# Patient Record
Sex: MALE | Race: WHITE | Employment: OTHER | URBAN - NONMETROPOLITAN AREA
[De-identification: names, ages, dates, MRNs, and addresses within clinical notes are randomized per-mention and may not be internally consistent; named-entity substitution may affect disease eponyms.]

---

## 2017-03-28 ENCOUNTER — HOSPITAL ENCOUNTER (OUTPATIENT)
Dept: NON INVASIVE DIAGNOSTICS | Age: 52
Discharge: HOME | End: 2017-03-28
Payer: COMMERCIAL

## 2017-03-28 LAB
LV EF: 50 %
LVEF MODALITY: NORMAL

## 2017-03-28 PROCEDURE — 93350 STRESS TTE ONLY: CPT

## 2017-10-31 ENCOUNTER — TELEPHONE (OUTPATIENT)
Dept: NEUROLOGY | Age: 52
End: 2017-10-31

## 2017-12-11 ENCOUNTER — OFFICE VISIT (OUTPATIENT)
Dept: NEUROLOGY | Facility: CLINIC | Age: 52
End: 2017-12-11

## 2017-12-11 VITALS
DIASTOLIC BLOOD PRESSURE: 72 MMHG | HEIGHT: 70 IN | RESPIRATION RATE: 18 BRPM | SYSTOLIC BLOOD PRESSURE: 130 MMHG | HEART RATE: 78 BPM | BODY MASS INDEX: 28.92 KG/M2 | WEIGHT: 202 LBS

## 2017-12-11 DIAGNOSIS — R20.0 NUMBNESS OF UPPER EXTREMITY: Primary | ICD-10-CM

## 2017-12-11 DIAGNOSIS — R26.89 IMBALANCE: ICD-10-CM

## 2017-12-11 PROCEDURE — 99204 OFFICE O/P NEW MOD 45 MIN: CPT | Performed by: PSYCHIATRY & NEUROLOGY

## 2017-12-11 RX ORDER — ATORVASTATIN CALCIUM 10 MG/1
10 TABLET, FILM COATED ORAL DAILY
COMMUNITY

## 2017-12-11 NOTE — PROGRESS NOTES
Subjective   Matheus Pruett, 1965, is a male who is being seen today for   Chief Complaint   Patient presents with   • Numbness     upper extremity right and left       HISTORY OF PRESENT ILLNESS: Patient  had numbness and chest/ face and left arm originally several months ago.  This lasted about a week and then switch to the right side of the body including the right arm which is lasted for several months.  Patient states he has headaches which are usually occipital to frontal without nausea and vomiting.  Patient is chronically blurred vision and mainly in the left eye where after his cataract surgery patient is had no neck pain.  Patient is had wrist pain and got x-rays of his right wrist which showed no abnormalities.  Patient had EMG and nerve conduction of the upper extremities at another facility which showed tendency for some carpal tunnel the wrist bilaterally and EMG was noncontributory.  There is also noted to have ulnar sensory action potentials amplitudes decreased bilaterally.  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  Patient is had no head or neck injury.  Patient is had some recent blood work that I do not have that for review.  Patient does occasionally has some dizziness    REVIEW OF SYSTEMS:   GENERAL: As above  PULMONARY: No acute chest pain or palpitation  CVS:  No acute chest pain or palpitation  GASTROINTESTINAL: No acute GI distress  GENITOURINARY: No acute  distress  GYN: Not applicable  MUSCULOSKELETAL: As above  HEENT:  As above  ENDOCRINE:  No acute endocrine symptoms  PSYCHIATRIC: No acute psychiatric symptoms  HEMATOLOGY: No recent blood work for review  SKIN: No skin changes  Family history reviewed and otherwise noncontributory  Social history: Patient does smoke.  Patient denies alcohol or drug use      PHYSICAL EXAMINATION:    GENERAL: No acute distress.  Negative Tinel's wrist and elbow negative Phalen's sign but positive Adson's maneuvers  CRANIUM:  Motor sensory about the face normal and symmetric      HEENT: No acute fundic abnormalities.  Pupils equal round reactive to light.  EOMs intact without nystagmus and fields full to confrontation        EYES: As above       EARS:  Tympanic membranes normal hears tuning fork bilaterally       THROAT: No oropharynx abnormalities       NECK:  No bruits/no lymphadenopathy  CHEST: No acute cardiopulmonary abnormalities by auscultation  ABDOMEN: Nondistended  EXTREMITIES: Pulses symmetrical  NEURO: Patient alert and follows commands without difficulty  SPEECH:  Normal    CRANIAL NERVES:  Motor sensory about the face normal and symmetric    MOTOR STRENGTH:  Motor strength upper and lower extremities normal  STATION AND GAIT:  Gait normal/Romberg negative  CEREBELLAR:  Finger-nose and heel shin normal  SENSORY:  Pin and vibration normal upper and lower extremities  REFLEXES:  Reflexes are present and symmetric upper and lower extremity Babinski'    ASSESSMENT AND PLAN:  Patient with history of numbness in the extremities as above.  We are doing MRI brain MRI neck and noninvasive carotids and thoracic outlet studies.  Also doing further blood work.        Matheus was seen today for numbness.    Diagnoses and all orders for this visit:    Numbness of upper extremity  -     CBC & Differential; Future  -     Comprehensive Metabolic Panel; Future  -     Lipid Panel; Future  -     Magnesium; Future  -     Cancel: MRI Brain Without Contrast; Future  -     Cancel: MRI Cervical Spine Without Contrast; Future  -     Sedimentation Rate; Future  -     T4, Free; Future  -     Vitamin B12; Future  -     Folate; Future  -     US Segmental Limbs Upper Arterial With Stress; Future  -     Cancel: EMG; Future  -     MRI Brain Without Contrast; Future  -     MRI Cervical Spine Without Contrast; Future    Imbalance  -     US Carotid Bilateral; Future    Other orders  -     Cancel: EEG; Future

## 2017-12-22 ENCOUNTER — HOSPITAL ENCOUNTER (OUTPATIENT)
Dept: MRI IMAGING | Facility: HOSPITAL | Age: 52
Discharge: HOME | End: 2017-12-22
Attending: PSYCHIATRY & NEUROLOGY

## 2017-12-22 ENCOUNTER — HOSPITAL ENCOUNTER (OUTPATIENT)
Dept: ULTRASOUND IMAGING | Facility: HOSPITAL | Age: 52
Discharge: HOME | End: 2017-12-22
Attending: PSYCHIATRY & NEUROLOGY | Admitting: PSYCHIATRY & NEUROLOGY

## 2017-12-22 ENCOUNTER — HOSPITAL ENCOUNTER (OUTPATIENT)
Dept: ULTRASOUND IMAGING | Facility: HOSPITAL | Age: 52
Discharge: HOME | End: 2017-12-22
Attending: PSYCHIATRY & NEUROLOGY

## 2017-12-22 DIAGNOSIS — R20.0 NUMBNESS OF UPPER EXTREMITY: ICD-10-CM

## 2017-12-22 DIAGNOSIS — R26.89 IMBALANCE: ICD-10-CM

## 2017-12-22 PROCEDURE — 93923 UPR/LXTR ART STDY 3+ LVLS: CPT

## 2017-12-22 PROCEDURE — 93923 UPR/LXTR ART STDY 3+ LVLS: CPT | Performed by: SURGERY

## 2017-12-22 PROCEDURE — 72141 MRI NECK SPINE W/O DYE: CPT

## 2017-12-22 PROCEDURE — 70551 MRI BRAIN STEM W/O DYE: CPT

## 2017-12-22 PROCEDURE — 93880 EXTRACRANIAL BILAT STUDY: CPT | Performed by: SURGERY

## 2017-12-22 PROCEDURE — 93880 EXTRACRANIAL BILAT STUDY: CPT

## 2018-03-02 ENCOUNTER — OFFICE VISIT (OUTPATIENT)
Dept: NEUROLOGY | Facility: CLINIC | Age: 53
End: 2018-03-02

## 2018-03-02 ENCOUNTER — TELEPHONE (OUTPATIENT)
Dept: NEUROLOGY | Facility: CLINIC | Age: 53
End: 2018-03-02

## 2018-03-02 VITALS
HEART RATE: 78 BPM | SYSTOLIC BLOOD PRESSURE: 128 MMHG | WEIGHT: 196 LBS | DIASTOLIC BLOOD PRESSURE: 72 MMHG | HEIGHT: 70 IN | BODY MASS INDEX: 28.06 KG/M2 | RESPIRATION RATE: 18 BRPM

## 2018-03-02 DIAGNOSIS — G89.29 CHRONIC INTRACTABLE HEADACHE, UNSPECIFIED HEADACHE TYPE: Primary | ICD-10-CM

## 2018-03-02 DIAGNOSIS — G56.01 RIGHT CARPAL TUNNEL SYNDROME: ICD-10-CM

## 2018-03-02 DIAGNOSIS — M48.02 NEURAL FORAMINAL STENOSIS OF CERVICAL SPINE: ICD-10-CM

## 2018-03-02 DIAGNOSIS — R40.0 DAYTIME SOMNOLENCE: ICD-10-CM

## 2018-03-02 DIAGNOSIS — R06.83 SNORING: ICD-10-CM

## 2018-03-02 DIAGNOSIS — R51.9 CHRONIC INTRACTABLE HEADACHE, UNSPECIFIED HEADACHE TYPE: Primary | ICD-10-CM

## 2018-03-02 PROCEDURE — 99214 OFFICE O/P EST MOD 30 MIN: CPT | Performed by: CLINICAL NURSE SPECIALIST

## 2018-03-02 PROCEDURE — 99406 BEHAV CHNG SMOKING 3-10 MIN: CPT | Performed by: CLINICAL NURSE SPECIALIST

## 2018-03-02 RX ORDER — AMITRIPTYLINE HYDROCHLORIDE 10 MG/1
10 TABLET, FILM COATED ORAL NIGHTLY
Qty: 30 TABLET | Refills: 2 | Status: SHIPPED | OUTPATIENT
Start: 2018-03-02

## 2018-03-02 NOTE — TELEPHONE ENCOUNTER
----- Message from GRETA Acosta sent at 3/2/2018  2:42 PM CST -----  Please contact patient that B12 is on low end of normal and to take OTC B12.   Thanks  kimmie

## 2018-03-02 NOTE — PROGRESS NOTES
Subjective     Chief Complaint   Patient presents with   • Follow-up     4 week follow-up.       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 He was last seen 12/11/17. He is accompanied by his wife. He did have MRI brain, CUS, UE arterial studies, MRI cervical spine and labs. MRI cervical spine did show spurs and neuro foraminal narrowing severe. Will refer to NS. MRI vishnu shows age related changes. , UE arterial studies negative. Labs did show mildly low B12 300. I have reviewed results with the patient. Patient was told had right carpal tunnel and did have EMG/NCV at outside facility and is in the media tab.  He has not been wearing cock up splint.   Patient has hx of dyslipidemia, HTN, he does snore and complains of sleepiness.   EPWORTH =3, STOP-BANG = HIGH.    Had episode of numbness from head to toe on right side about 1 year ago. Can have sensations of feeling numb all over and can have jerks. Last time this happened about August 2017. happened several times. Not sure if had HA before or after, did not check BP with episodes.     Headache    This is a chronic problem. The current episode started more than 1 year ago (2017). The problem occurs daily (wake up with and go to bed with it). The problem has been unchanged. Pain location: holocephalic. The pain does not radiate. The pain quality is similar to prior headaches. The quality of the pain is described as dull and aching. The pain is at a severity of 2/10. The pain is mild. Pertinent negatives include no dizziness, nausea, phonophobia, sinus pressure, vomiting or weakness. Numbness: ARMS. Nothing aggravates the symptoms. Treatments tried: ibuprofen, takes 4 tablets daily at minimum. The treatment provided no relief. (Dyslipidemia, tobacco)        Current Outpatient Prescriptions   Medication Sig Dispense Refill   • atorvastatin (LIPITOR) 10 MG tablet Take 10 mg by  "mouth Daily.     • losartan (COZAAR) 50 MG tablet Take 50 mg by mouth Daily.     • VITAMIN D, CHOLECALCIFEROL, PO Take 3,000 Units by mouth Daily.     • amitriptyline (ELAVIL) 10 MG tablet Take 1 tablet by mouth Every Night. 30 tablet 2     No current facility-administered medications for this visit.        Past Medical History:   Diagnosis Date   • Hypertension        Past Surgical History:   Procedure Laterality Date   • EYE SURGERY         family history includes Cancer in his father and mother.    Social History   Substance Use Topics   • Smoking status: Current Every Day Smoker     Packs/day: 0.50     Types: Cigarettes   • Smokeless tobacco: Never Used   • Alcohol use No       Review of Systems   Constitutional: Positive for fatigue.   HENT: Negative for postnasal drip and sinus pressure.    Eyes: Negative for visual disturbance.   Respiratory: Negative for apnea, choking and shortness of breath.    Cardiovascular: Negative for chest pain.   Gastrointestinal: Negative for blood in stool, constipation, nausea and vomiting.   Genitourinary: Negative for dysuria and frequency.   Musculoskeletal: Negative for arthralgias, gait problem and myalgias.   Neurological: Positive for headaches. Negative for dizziness and weakness. Numbness: ARMS.   Hematological: Negative for adenopathy.   Psychiatric/Behavioral: Negative for agitation, confusion and hallucinations.       Objective     /72 (BP Location: Right arm, Patient Position: Sitting, Cuff Size: Adult)  Pulse 78  Resp 18  Ht 177.8 cm (70\")  Wt 88.9 kg (196 lb)  BMI 28.12 kg/m2, Body mass index is 28.12 kg/(m^2).    Physical Exam   Constitutional: He is oriented to person, place, and time. He appears well-developed and well-nourished. He is cooperative.   HENT:   Head: Normocephalic and atraumatic.   Right Ear: External ear normal.   Left Ear: External ear normal.   Nose: Nose normal.   Mouth/Throat: Oropharynx is clear and moist.   Eyes: Conjunctivae, EOM " and lids are normal. Pupils are equal, round, and reactive to light. Right eye exhibits normal extraocular motion and no nystagmus. Left eye exhibits normal extraocular motion and no nystagmus. Right pupil is round. Left pupil is round and reactive. Pupils are equal.   Neck: Normal range of motion. Neck supple. Carotid bruit is not present.   Cardiovascular: Normal rate, regular rhythm and normal heart sounds.    No murmur heard.  Pulmonary/Chest: Effort normal and breath sounds normal.   Abdominal: Soft. Bowel sounds are normal.   Musculoskeletal: Normal range of motion.   Neurological: He is alert and oriented to person, place, and time. He has normal strength and normal reflexes. He displays no tremor. No cranial nerve deficit or sensory deficit. He exhibits normal muscle tone. He displays a negative Romberg sign. Coordination and gait normal. GCS eye subscore is 4. GCS verbal subscore is 5. GCS motor subscore is 6.   Reflex Scores:       Tricep reflexes are 2+ on the right side and 2+ on the left side.       Bicep reflexes are 2+ on the right side and 2+ on the left side.       Brachioradialis reflexes are 2+ on the right side and 2+ on the left side.       Patellar reflexes are 2+ on the right side and 2+ on the left side.       Achilles reflexes are 2+ on the right side and 2+ on the left side.  CN II:  Visual fields full.  Pupils equally reactive to light  CN III, IV, VI:  Extraocular Muscles full with no signs of nystagmus  CN V:  Facial sensory is symmetric with no asymetries.  CN VII:  Facial motor symmetric  CN VIII:  Gross hearing intact bilaterally  CN IX:  Palate elevates symmetrically  CN X:  Palate elevates symmetrically  CN XI:  Shoulder shrug symmetric  CN XII:  Tongue is midline on protrusion   Skin: Skin is warm and dry.   Psychiatric: He has a normal mood and affect. His speech is normal and behavior is normal. Cognition and memory are normal.   Nursing note and vitals reviewed.    MRI BRAIN:  IMPRESSION:  1.  No acute intracranial abnormalities.  2.  Nonspecific foci of gliosis in the supratentorial white matter,  favored to reflect mild chronic microvascular changes.    MRI CERVICAL SPINE: IMPRESSION:  Cervical spine.  1.  Congenital and acquired spondylosis.  2.  Multilevel degenerative changes, worse at C6-C7.      UE ARTERIAL STUDIES: IMPRESSION:  Impression:  1.  No significant arterial insufficiency of the right upper extremity  at rest.   2.  No significant arterial insufficiency of the left upper extremity at  rest.   3. There is no dampening of the waveforms with maneuvers.     CAROTID DUPLEX: IMPRESSION:  Impression:  1. There is less than 50% stenosis of the right internal carotid artery.  2. There is less than 50% stenosis of the left internal carotid artery.  3. Antegrade flow is demonstrated in bilateral vertebral arteries.    No results found for this or any previous visit.     ASSESSMENT/PLAN    Diagnoses and all orders for this visit:    Chronic intractable headache, unspecified headache type    Right carpal tunnel syndrome  -     Ambulatory Referral to Neurosurgery    Neural foraminal stenosis of cervical spine  -     Ambulatory Referral to Neurosurgery    Daytime somnolence  -     Home Sleep Study; Future    Snoring  -     Home Sleep Study; Future    Other orders  -     amitriptyline (ELAVIL) 10 MG tablet; Take 1 tablet by mouth Every Night.      MEDICAL DECISION MAKIN. Will trial Elavil for HA and counseled on side effects.  2. Patient to monitor and keep a log of BP and if elevated above 140/80 to contact PCP  3. Will refer to NS for cervical neuro foraminal stenosis  4. Will obtain home sleep study  5. Patient to start OTC B12 replacement.  6.Patient's BMI is above normal parameters. Follow-up plan includes:  educational material and no follow-up required.  7. I advised the patient of the risks in continuing to use tobacco, and I provided this patient with smoking cessation  educational materials.    During this visit, I spent > 3-10 minutes counseling the patient regarding smoking cessation.    At least 25 minutes spent in coordination of care and answering questions.      allergies and all known medications/prescriptions have been reviewed using resources available on this encounter.    Return in about 6 weeks (around 4/13/2018).        GRETA Ramirez

## 2018-03-02 NOTE — PATIENT INSTRUCTIONS
Calorie Counting for Weight Loss  Calories are units of energy. Your body needs a certain amount of calories from food to keep you going throughout the day. When you eat more calories than your body needs, your body stores the extra calories as fat. When you eat fewer calories than your body needs, your body burns fat to get the energy it needs.  Calorie counting means keeping track of how many calories you eat and drink each day. Calorie counting can be helpful if you need to lose weight. If you make sure to eat fewer calories than your body needs, you should lose weight. Ask your health care provider what a healthy weight is for you.  For calorie counting to work, you will need to eat the right number of calories in a day in order to lose a healthy amount of weight per week. A dietitian can help you determine how many calories you need in a day and will give you suggestions on how to reach your calorie goal.  · A healthy amount of weight to lose per week is usually 1-2 lb (0.5-0.9 kg). This usually means that your daily calorie intake should be reduced by 500-750 calories.  · Eating 1,200 - 1,500 calories per day can help most women lose weight.  · Eating 1,500 - 1,800 calories per day can help most men lose weight.  What is my plan?  My goal is to have __________ calories per day.  If I have this many calories per day, I should lose around __________ pounds per week.  What do I need to know about calorie counting?  In order to meet your daily calorie goal, you will need to:  · Find out how many calories are in each food you would like to eat. Try to do this before you eat.  · Decide how much of the food you plan to eat.  · Write down what you ate and how many calories it had. Doing this is called keeping a food log.  To successfully lose weight, it is important to balance calorie counting with a healthy lifestyle that includes regular activity. Aim for 150 minutes of moderate exercise (such as walking) or 75  minutes of vigorous exercise (such as running) each week.  Where do I find calorie information?     The number of calories in a food can be found on a Nutrition Facts label. If a food does not have a Nutrition Facts label, try to look up the calories online or ask your dietitian for help.  Remember that calories are listed per serving. If you choose to have more than one serving of a food, you will have to multiply the calories per serving by the amount of servings you plan to eat. For example, the label on a package of bread might say that a serving size is 1 slice and that there are 90 calories in a serving. If you eat 1 slice, you will have eaten 90 calories. If you eat 2 slices, you will have eaten 180 calories.  How do I keep a food log?  Immediately after each meal, record the following information in your food log:  · What you ate. Don't forget to include toppings, sauces, and other extras on the food.  · How much you ate. This can be measured in cups, ounces, or number of items.  · How many calories each food and drink had.  · The total number of calories in the meal.  Keep your food log near you, such as in a small notebook in your pocket, or use a mobile miriam or website. Some programs will calculate calories for you and show you how many calories you have left for the day to meet your goal.  What are some calorie counting tips?  · Use your calories on foods and drinks that will fill you up and not leave you hungry:  ¨ Some examples of foods that fill you up are nuts and nut butters, vegetables, lean proteins, and high-fiber foods like whole grains. High-fiber foods are foods with more than 5 g fiber per serving.  ¨ Drinks such as sodas, specialty coffee drinks, alcohol, and juices have a lot of calories, yet do not fill you up.  · Eat nutritious foods and avoid empty calories. Empty calories are calories you get from foods or beverages that do not have many vitamins or protein, such as candy, sweets, and  "soda. It is better to have a nutritious high-calorie food (such as an avocado) than a food with few nutrients (such as a bag of chips).  · Know how many calories are in the foods you eat most often. This will help you calculate calorie counts faster.  · Pay attention to calories in drinks. Low-calorie drinks include water and unsweetened drinks.  · Pay attention to nutrition labels for \"low fat\" or \"fat free\" foods. These foods sometimes have the same amount of calories or more calories than the full fat versions. They also often have added sugar, starch, or salt, to make up for flavor that was removed with the fat.  · Find a way of tracking calories that works for you. Get creative. Try different apps or programs if writing down calories does not work for you.  What are some portion control tips?  · Know how many calories are in a serving. This will help you know how many servings of a certain food you can have.  · Use a measuring cup to measure serving sizes. You could also try weighing out portions on a kitchen scale. With time, you will be able to estimate serving sizes for some foods.  · Take some time to put servings of different foods on your favorite plates, bowls, and cups so you know what a serving looks like.  · Try not to eat straight from a bag or box. Doing this can lead to overeating. Put the amount you would like to eat in a cup or on a plate to make sure you are eating the right portion.  · Use smaller plates, glasses, and bowls to prevent overeating.  · Try not to multitask (for example, watch TV or use your computer) while eating. If it is time to eat, sit down at a table and enjoy your food. This will help you to know when you are full. It will also help you to be aware of what you are eating and how much you are eating.  What are tips for following this plan?  Reading food labels   · Check the calorie count compared to the serving size. The serving size may be smaller than what you are used to " eating.  · Check the source of the calories. Make sure the food you are eating is high in vitamins and protein and low in saturated and trans fats.  Shopping   · Read nutrition labels while you shop. This will help you make healthy decisions before you decide to purchase your food.  · Make a grocery list and stick to it.  Cooking   · Try to cook your favorite foods in a healthier way. For example, try baking instead of frying.  · Use low-fat dairy products.  Meal planning   · Use more fruits and vegetables. Half of your plate should be fruits and vegetables.  · Include lean proteins like poultry and fish.  How do I count calories when eating out?  · Ask for smaller portion sizes.  · Consider sharing an entree and sides instead of getting your own entree.  · If you get your own entree, eat only half. Ask for a box at the beginning of your meal and put the rest of your entree in it so you are not tempted to eat it.  · If calories are listed on the menu, choose the lower calorie options.  · Choose dishes that include vegetables, fruits, whole grains, low-fat dairy products, and lean protein.  · Choose items that are boiled, broiled, grilled, or steamed. Stay away from items that are buttered, battered, fried, or served with cream sauce. Items labeled “crispy” are usually fried, unless stated otherwise.  · Choose water, low-fat milk, unsweetened iced tea, or other drinks without added sugar. If you want an alcoholic beverage, choose a lower calorie option such as a glass of wine or light beer.  · Ask for dressings, sauces, and syrups on the side. These are usually high in calories, so you should limit the amount you eat.  · If you want a salad, choose a garden salad and ask for grilled meats. Avoid extra toppings like card, cheese, or fried items. Ask for the dressing on the side, or ask for olive oil and vinegar or lemon to use as dressing.  · Estimate how many servings of a food you are given. For example, a serving  of cooked rice is ½ cup or about the size of half a baseball. Knowing serving sizes will help you be aware of how much food you are eating at restaurants. The list below tells you how big or small some common portion sizes are based on everyday objects:  ¨ 1 oz--4 stacked dice.  ¨ 3 oz--1 deck of cards.  ¨ 1 tsp--1 die.  ¨ 1 Tbsp--½ a ping-pong ball.  ¨ 2 Tbsp--1 ping-pong ball.  ¨ ½ cup--½ baseball.  ¨ 1 cup--1 baseball.  Summary  · Calorie counting means keeping track of how many calories you eat and drink each day. If you eat fewer calories than your body needs, you should lose weight.  · A healthy amount of weight to lose per week is usually 1-2 lb (0.5-0.9 kg). This usually means reducing your daily calorie intake by 500-750 calories.  · The number of calories in a food can be found on a Nutrition Facts label. If a food does not have a Nutrition Facts label, try to look up the calories online or ask your dietitian for help.  · Use your calories on foods and drinks that will fill you up, and not on foods and drinks that will leave you hungry.  · Use smaller plates, glasses, and bowls to prevent overeating.  This information is not intended to replace advice given to you by your health care provider. Make sure you discuss any questions you have with your health care provider.  Document Released: 12/18/2006 Document Revised: 11/17/2017 Document Reviewed: 11/17/2017  [employer] Interactive Patient Education © 2017 [employer]  Steps to Quit Smoking  Smoking tobacco can be bad for your health. It can also affect almost every organ in your body. Smoking puts you and people around you at risk for many serious long-lasting (chronic) diseases. Quitting smoking is hard, but it is one of the best things that you can do for your health. It is never too late to quit.  What are the benefits of quitting smoking?  When you quit smoking, you lower your risk for getting serious diseases and conditions. They can include:  · Lung  cancer or lung disease.  · Heart disease.  · Stroke.  · Heart attack.  · Not being able to have children (infertility).  · Weak bones (osteoporosis) and broken bones (fractures).  If you have coughing, wheezing, and shortness of breath, those symptoms may get better when you quit. You may also get sick less often. If you are pregnant, quitting smoking can help to lower your chances of having a baby of low birth weight.  What can I do to help me quit smoking?  Talk with your doctor about what can help you quit smoking. Some things you can do (strategies) include:  · Quitting smoking totally, instead of slowly cutting back how much you smoke over a period of time.  · Going to in-person counseling. You are more likely to quit if you go to many counseling sessions.  · Using resources and support systems, such as:  ¨ Online chats with a counselor.  ¨ Phone quitlines.  ¨ Printed self-help materials.  ¨ Support groups or group counseling.  ¨ Text messaging programs.  ¨ Mobile phone apps or applications.  · Taking medicines. Some of these medicines may have nicotine in them. If you are pregnant or breastfeeding, do not take any medicines to quit smoking unless your doctor says it is okay. Talk with your doctor about counseling or other things that can help you.  Talk with your doctor about using more than one strategy at the same time, such as taking medicines while you are also going to in-person counseling. This can help make quitting easier.  What things can I do to make it easier to quit?  Quitting smoking might feel very hard at first, but there is a lot that you can do to make it easier. Take these steps:  · Talk to your family and friends. Ask them to support and encourage you.  · Call phone quitlines, reach out to support groups, or work with a counselor.  · Ask people who smoke to not smoke around you.  · Avoid places that make you want (trigger) to smoke, such as:  ¨ Bars.  ¨ Parties.  ¨ Smoke-break areas at  work.  · Spend time with people who do not smoke.  · Lower the stress in your life. Stress can make you want to smoke. Try these things to help your stress:  ¨ Getting regular exercise.  ¨ Deep-breathing exercises.  ¨ Yoga.  ¨ Meditating.  ¨ Doing a body scan. To do this, close your eyes, focus on one area of your body at a time from head to toe, and notice which parts of your body are tense. Try to relax the muscles in those areas.  · Download or buy apps on your mobile phone or tablet that can help you stick to your quit plan. There are many free apps, such as QuitGuide from the CDC (Centers for Disease Control and Prevention). You can find more support from smokefree.gov and other websites.  This information is not intended to replace advice given to you by your health care provider. Make sure you discuss any questions you have with your health care provider.  Document Released: 10/14/2010 Document Revised: 08/15/2017 Document Reviewed: 05/03/2016  [employer] Interactive Patient Education © 2017 [employer]

## 2018-03-28 ENCOUNTER — OFFICE VISIT (OUTPATIENT)
Dept: NEUROSURGERY | Facility: CLINIC | Age: 53
End: 2018-03-28

## 2018-03-28 VITALS — BODY MASS INDEX: 28.06 KG/M2 | HEIGHT: 70 IN | WEIGHT: 196 LBS

## 2018-03-28 DIAGNOSIS — R51.9 CHRONIC INTRACTABLE HEADACHE, UNSPECIFIED HEADACHE TYPE: ICD-10-CM

## 2018-03-28 DIAGNOSIS — F17.200 SMOKER: ICD-10-CM

## 2018-03-28 DIAGNOSIS — M48.02 NEURAL FORAMINAL STENOSIS OF CERVICAL SPINE: Primary | ICD-10-CM

## 2018-03-28 DIAGNOSIS — G56.01 RIGHT CARPAL TUNNEL SYNDROME: ICD-10-CM

## 2018-03-28 DIAGNOSIS — G89.29 CHRONIC INTRACTABLE HEADACHE, UNSPECIFIED HEADACHE TYPE: ICD-10-CM

## 2018-03-28 PROCEDURE — 99214 OFFICE O/P EST MOD 30 MIN: CPT | Performed by: NURSE PRACTITIONER

## 2018-03-28 NOTE — PROGRESS NOTES
Neurosurgery Initial Patient Visit    Patient: Matheus Pruett  : 1965    Primary Care Provider: Mikey Mott PA-C    Requesting Provider: GRETA Quintero      History    Chief Complaint:   Chief Complaint   Patient presents with   • Neck Pain     Pt states that he is having neck, hip and R hand pain.  Pt states that this has been going on for a year. Pt denies any injury.  Pt denies chicropractor, PM or PT.  Pt states that he had a NCV done, however, there is no record of this.   • Arm Pain       History of Present Illness: He is a 52-year-old  male who presents with chief complaint of right hand and neck pain.  He is referred by GRETA Quintero, and we have been asked to see him in consultation for further evaluation.    He gives history of a more insidious onset of problems about a year ago.  He does not recall any contributing injury or event.  Initially, he had been having more symptoms that now appear to have resolved.  Presently, he complains of headaches with neck pain.  He then has pain from his right elbow down to his hand and fingers.  It is mostly the second and third fingers that are affected.  He complains of numbness and tingling and is also starting to notice some weakness.  He has not noted any true radicular pain or symptoms into the upper arm.  He has had no symptoms at all occurring on the left.  He is presently undergoing a neurologic evaluation that has included a cervical MRI.  We have been asked to see him with regard to those results.    Allergies:   Lortab [hydrocodone-acetaminophen]    Past Medical History:    Past Medical History:   Diagnosis Date   • Hypercholesteremia    • Hypertension    • Right carpal tunnel syndrome        Past Surgical History:   Past Surgical History:   Procedure Laterality Date   • EYE SURGERY         Medications:    Current Outpatient Prescriptions on File Prior to Visit   Medication Sig Dispense Refill   • amitriptyline (ELAVIL) 10 MG  tablet Take 1 tablet by mouth Every Night. 30 tablet 2   • atorvastatin (LIPITOR) 10 MG tablet Take 10 mg by mouth Daily.     • losartan (COZAAR) 50 MG tablet Take 50 mg by mouth Daily.     • VITAMIN D, CHOLECALCIFEROL, PO Take 3,000 Units by mouth Daily.       No current facility-administered medications on file prior to visit.         Social History:   reports that he has been smoking Cigarettes.  He has been smoking about 2.00 packs per day. He has never used smokeless tobacco. He reports that he does not drink alcohol or use drugs.  He is  and his wife accompanies him.  They live in [place].  He is the owner of a poultry farm.  With regards to smoking cessation, he states that he is giving it some thought and has tried in the past.  He understands the importance.    Family History:    Family History   Problem Relation Age of Onset   • Cancer Mother      colon   • Cancer Father      esophageal   • Diabetes Sister    • Hypertension Sister    • Diabetes Brother    • Hypertension Brother    • Heart disease Sister    • Hypertension Sister        Review of Systems:  Review of Systems   Constitutional: Negative for chills, fever and unexpected weight change.   HENT: Negative for congestion, hearing loss, rhinorrhea, sore throat and tinnitus.    Eyes: Negative for photophobia and visual disturbance.   Respiratory: Negative for cough, shortness of breath and wheezing.    Cardiovascular: Negative for chest pain and palpitations.   Gastrointestinal: Negative for constipation, diarrhea, nausea and vomiting.   Genitourinary: Negative for difficulty urinating.   Musculoskeletal: Positive for arthralgias, myalgias and neck pain. Negative for back pain and gait problem.   Skin: Negative for rash.   Allergic/Immunologic: Negative for environmental allergies.   Neurological: Positive for headaches. Negative for seizures and numbness.   Hematological: Does not bruise/bleed easily.   Psychiatric/Behavioral: Negative for  confusion. The patient is not nervous/anxious.    All other systems reviewed and are negative.          Neurological Physical Examination    Physical Exam   Constitutional: He is oriented to person, place, and time. He appears well-developed and well-nourished. No distress.   He is pleasant and cooperative.  He appears uncomfortable, but in no acute distress.   HENT:   Head: Normocephalic and atraumatic.   He appears to be edentulous.     Eyes: No scleral icterus.   Neck: Neck supple. No tracheal deviation and normal range of motion present.   Cardiovascular: Normal rate, regular rhythm and normal heart sounds.    No murmur heard.  Pulmonary/Chest: Effort normal and breath sounds normal. No respiratory distress. He has no wheezes.   Respirations even and unlabored with no acute distress.  Lung fields are diminished throughout.   Musculoskeletal:   Cervical range of motion is limited with lateral movement and elicits discomfort.  As fairly good symmetric strength of the upper extremities.   strength and pronation seeming to be weaker on the right.  Deep tendon reflexes diminished bilaterally.  Thumb abduction is fairly good bilaterally.  There is some sponginess and weakness of thenar muscle on the right.  There is mildly positive Tinel's at the right wrist.   Neurological: He is alert and oriented to person, place, and time. He displays normal reflexes. No cranial nerve deficit.   Optic discs not visualized.   Skin: Skin is warm and dry. No rash noted.   Psychiatric: He has a normal mood and affect. His speech is normal and behavior is normal. Cognition and memory are normal.   Vitals reviewed.         Medical Decision Making    Management Options:  In the office we have discussed his care.  I have reviewed his cervical MRI here at [facility].  Findings are primarily disc degeneration and spondylosis at C5 6 and greater at C6 7.  This does result in some degree of foraminal narrowing bilaterally.  I cannot  determine that there is a definite impingement.  Also, I am not certain that the findings clearly correlate with his symptoms and clinical signs on exam.    What he describes very much suggests issues with right carpal tunnel rather than a definite cervical radiculopathy.  He has had some difficulty trying to wear his splint that was prescribed by neurology.  He states he just cannot wear it during the daytime due to his work.  He is going to try wearing it to sleep in and then if possible during some of the daytime.  We have also talked about physical therapy and he will try to attend at least 1-2 sessions a week for the next 3-4 weeks.  We will also hope to focus on a home exercise program.  I am hoping that the physical therapy will help to better delineate the actual cause of his current issues.  See him back with Dr. Quach about a month to determine his response to therapy as well as more regular use of a wrist splint, and to personally review the patient's imaging and test results.  They are agreeable.    Information regarding BMI as well as smoking cessation has been given in an effort to help support overall health and wellness.  Given his overall stature and frame, I do not consider his BMI to be an accurate reflection.    Matheus was seen today for neck pain and arm pain.    Diagnoses and all orders for this visit:    Neural foraminal stenosis of cervical spine  -     Ambulatory Referral to Physical Therapy Evaluate and treat (3x a week for 4 weeks), Neuro, Ortho    Chronic intractable headache, unspecified headache type  -     Ambulatory Referral to Physical Therapy Evaluate and treat (3x a week for 4 weeks), Neuro, Ortho    Right carpal tunnel syndrome  -     Ambulatory Referral to Physical Therapy Evaluate and treat (3x a week for 4 weeks), Neuro, Ortho    BMI 28.0-28.9,adult    Smoker        Thank you, GRETA Acosta for this Consultation and the opportunity to participate in the care of this  pleasant patient.

## 2018-04-02 ENCOUNTER — HOSPITAL ENCOUNTER (OUTPATIENT)
Dept: SLEEP MEDICINE | Facility: HOSPITAL | Age: 53
Discharge: HOME | End: 2018-04-02
Admitting: CLINICAL NURSE SPECIALIST

## 2018-04-02 DIAGNOSIS — R40.0 DAYTIME SOMNOLENCE: ICD-10-CM

## 2018-04-02 DIAGNOSIS — R06.83 SNORING: ICD-10-CM

## 2018-04-02 PROCEDURE — 95806 SLEEP STUDY UNATT&RESP EFFT: CPT

## 2018-04-02 PROCEDURE — 95806 SLEEP STUDY UNATT&RESP EFFT: CPT | Performed by: PSYCHIATRY & NEUROLOGY

## 2018-04-27 ENCOUNTER — TRANSCRIBE ORDERS (OUTPATIENT)
Dept: SLEEP MEDICINE | Facility: HOSPITAL | Age: 53
End: 2018-04-27

## 2018-04-27 DIAGNOSIS — G47.33 OBSTRUCTIVE SLEEP APNEA, ADULT: Primary | ICD-10-CM

## 2020-03-13 ENCOUNTER — HOSPITAL ENCOUNTER (INPATIENT)
Age: 55
LOS: 2 days | Discharge: HOME | DRG: 247 | End: 2020-03-15
Attending: INTERNAL MEDICINE | Admitting: INTERNAL MEDICINE
Payer: MEDICAID

## 2020-03-13 LAB
HCT VFR BLD CALC: 42.1 % (ref 42–52)
HEMOGLOBIN: 14 G/DL (ref 14–18)
MCH RBC QN AUTO: 29.3 PG (ref 27–31)
MCHC RBC AUTO-ENTMCNC: 33.3 G/DL (ref 33–37)
MCV RBC AUTO: 88.1 FL (ref 80–94)
PDW BLD-RTO: 12.9 % (ref 11.5–14.5)
PLATELET # BLD: 223 K/UL (ref 130–400)
PMV BLD AUTO: 10 FL (ref 9.4–12.4)
POC ACT LR: 240 SEC
RBC # BLD: 4.78 M/UL (ref 4.7–6.1)
WBC # BLD: 9.6 K/UL (ref 4.8–10.8)

## 2020-03-13 PROCEDURE — 6370000000 HC RX 637 (ALT 250 FOR IP): Performed by: INTERNAL MEDICINE

## 2020-03-13 PROCEDURE — 99152 MOD SED SAME PHYS/QHP 5/>YRS: CPT

## 2020-03-13 PROCEDURE — 92941 PRQ TRLML REVSC TOT OCCL AMI: CPT

## 2020-03-13 PROCEDURE — 4A023N8 MEASUREMENT OF CARDIAC SAMPLING AND PRESSURE, BILATERAL, PERCUTANEOUS APPROACH: ICD-10-PCS | Performed by: INTERNAL MEDICINE

## 2020-03-13 PROCEDURE — B2111ZZ FLUOROSCOPY OF MULTIPLE CORONARY ARTERIES USING LOW OSMOLAR CONTRAST: ICD-10-PCS | Performed by: INTERNAL MEDICINE

## 2020-03-13 PROCEDURE — C1760 CLOSURE DEV, VASC: HCPCS

## 2020-03-13 PROCEDURE — 6360000004 HC RX CONTRAST MEDICATION: Performed by: INTERNAL MEDICINE

## 2020-03-13 PROCEDURE — 2580000003 HC RX 258: Performed by: INTERNAL MEDICINE

## 2020-03-13 PROCEDURE — 4500000002 HC ER NO CHARGE

## 2020-03-13 PROCEDURE — C1725 CATH, TRANSLUMIN NON-LASER: HCPCS

## 2020-03-13 PROCEDURE — C1887 CATHETER, GUIDING: HCPCS

## 2020-03-13 PROCEDURE — C1874 STENT, COATED/COV W/DEL SYS: HCPCS

## 2020-03-13 PROCEDURE — 027034Z DILATION OF CORONARY ARTERY, ONE ARTERY WITH DRUG-ELUTING INTRALUMINAL DEVICE, PERCUTANEOUS APPROACH: ICD-10-PCS | Performed by: INTERNAL MEDICINE

## 2020-03-13 PROCEDURE — C1769 GUIDE WIRE: HCPCS

## 2020-03-13 PROCEDURE — 93454 CORONARY ARTERY ANGIO S&I: CPT

## 2020-03-13 PROCEDURE — 2709999900 HC NON-CHARGEABLE SUPPLY

## 2020-03-13 PROCEDURE — 2100000000 HC CCU R&B

## 2020-03-13 PROCEDURE — 85347 COAGULATION TIME ACTIVATED: CPT

## 2020-03-13 PROCEDURE — C1894 INTRO/SHEATH, NON-LASER: HCPCS

## 2020-03-13 PROCEDURE — 99153 MOD SED SAME PHYS/QHP EA: CPT

## 2020-03-13 RX ORDER — ATORVASTATIN CALCIUM 10 MG/1
10 TABLET, FILM COATED ORAL NIGHTLY
Status: DISCONTINUED | OUTPATIENT
Start: 2020-03-13 | End: 2020-03-15 | Stop reason: HOSPADM

## 2020-03-13 RX ORDER — ACETAMINOPHEN 325 MG/1
650 TABLET ORAL EVERY 4 HOURS PRN
Status: DISCONTINUED | OUTPATIENT
Start: 2020-03-13 | End: 2020-03-15 | Stop reason: HOSPADM

## 2020-03-13 RX ORDER — IODIXANOL 320 MG/ML
200 INJECTION, SOLUTION INTRAVASCULAR
Status: COMPLETED | OUTPATIENT
Start: 2020-03-13 | End: 2020-03-13

## 2020-03-13 RX ORDER — ATORVASTATIN CALCIUM 10 MG/1
10 TABLET, FILM COATED ORAL DAILY
COMMUNITY
End: 2020-03-17 | Stop reason: ALTCHOICE

## 2020-03-13 RX ORDER — LOSARTAN POTASSIUM 100 MG/1
1 TABLET ORAL DAILY
Status: ON HOLD | COMMUNITY
Start: 2020-03-13 | End: 2020-03-15 | Stop reason: HOSPADM

## 2020-03-13 RX ORDER — SODIUM CHLORIDE 0.9 % (FLUSH) 0.9 %
10 SYRINGE (ML) INJECTION EVERY 12 HOURS SCHEDULED
Status: DISCONTINUED | OUTPATIENT
Start: 2020-03-13 | End: 2020-03-15 | Stop reason: HOSPADM

## 2020-03-13 RX ORDER — SODIUM CHLORIDE 0.9 % (FLUSH) 0.9 %
10 SYRINGE (ML) INJECTION PRN
Status: DISCONTINUED | OUTPATIENT
Start: 2020-03-13 | End: 2020-03-15 | Stop reason: HOSPADM

## 2020-03-13 RX ORDER — ASPIRIN 81 MG/1
81 TABLET, CHEWABLE ORAL DAILY
Status: DISCONTINUED | OUTPATIENT
Start: 2020-03-14 | End: 2020-03-15 | Stop reason: HOSPADM

## 2020-03-13 RX ORDER — SODIUM CHLORIDE 9 MG/ML
INJECTION, SOLUTION INTRAVENOUS CONTINUOUS
Status: DISCONTINUED | OUTPATIENT
Start: 2020-03-13 | End: 2020-03-14

## 2020-03-13 RX ADMIN — IODIXANOL 120 ML: 320 INJECTION, SOLUTION INTRAVASCULAR at 21:48

## 2020-03-13 RX ADMIN — ATORVASTATIN CALCIUM 10 MG: 10 TABLET, FILM COATED ORAL at 22:37

## 2020-03-13 RX ADMIN — SODIUM CHLORIDE: 9 INJECTION, SOLUTION INTRAVENOUS at 22:38

## 2020-03-13 SDOH — HEALTH STABILITY: MENTAL HEALTH: HOW OFTEN DO YOU HAVE A DRINK CONTAINING ALCOHOL?: NEVER

## 2020-03-13 ASSESSMENT — ENCOUNTER SYMPTOMS
EYES NEGATIVE: 1
SHORTNESS OF BREATH: 1
CHEST TIGHTNESS: 1
GASTROINTESTINAL NEGATIVE: 1
ALLERGIC/IMMUNOLOGIC NEGATIVE: 1

## 2020-03-13 ASSESSMENT — PAIN SCALES - GENERAL: PAINLEVEL_OUTOF10: 0

## 2020-03-13 NOTE — LETTER
[address] [facility]  Cardiac Rehab Department  [address] [place] [address] [address]  [phone]  Toll Free [phone]              March 16, 2020    Dear Dm Crump,    In follow-up to your recent hospitalization, please find this informational packet that has been sent to you on heart disease and the guidelines you are to follow concerning your present cardiac condition and immediate recovery. Due to your cardiac diagnosis and intervention you now qualify for participation in a Phase II Outpatient Cardiac Rehab Program.  This elective service has been shown to significantly reduce cardiac mortality by 26-31% among patients such as yourself! A brochure has been included in this mailing for the purpose of providing you with a brief overview of program components. Simply call [facility] ([phone]) or [facility] [phone] to inquire about program specifics and the opportunity to enroll. Feel free to reach out to us now or at any other time and our staff will be more than pleased to assist you. Thank you.     To the betterment of your health,    [address] [place] [address] Cardiac Rehab Staff

## 2020-03-14 PROBLEM — I21.3 STEMI (ST ELEVATION MYOCARDIAL INFARCTION) (HCC): Status: ACTIVE | Noted: 2020-03-14

## 2020-03-14 LAB
ALBUMIN SERPL-MCNC: 4 G/DL (ref 3.5–5.2)
ALP BLD-CCNC: 75 U/L (ref 40–130)
ALT SERPL-CCNC: 23 U/L (ref 5–41)
ANION GAP SERPL CALCULATED.3IONS-SCNC: 12 MMOL/L (ref 7–19)
AST SERPL-CCNC: 67 U/L (ref 5–40)
BILIRUB SERPL-MCNC: <0.2 MG/DL (ref 0.2–1.2)
BUN BLDV-MCNC: 13 MG/DL (ref 6–20)
CALCIUM SERPL-MCNC: 8.8 MG/DL (ref 8.6–10)
CHLORIDE BLD-SCNC: 103 MMOL/L (ref 98–111)
CHOLESTEROL, TOTAL: 141 MG/DL (ref 160–199)
CO2: 21 MMOL/L (ref 22–29)
CREAT SERPL-MCNC: 1 MG/DL (ref 0.5–1.2)
GFR NON-AFRICAN AMERICAN: >60
GLUCOSE BLD-MCNC: 105 MG/DL (ref 74–109)
HBA1C MFR BLD: 5.4 % (ref 4–6)
HDLC SERPL-MCNC: 33 MG/DL (ref 55–121)
LDL CHOLESTEROL CALCULATED: 68 MG/DL
LV EF: 58 %
LVEF MODALITY: NORMAL
POTASSIUM SERPL-SCNC: 4.4 MMOL/L (ref 3.5–5)
SODIUM BLD-SCNC: 136 MMOL/L (ref 136–145)
TOTAL PROTEIN: 6.5 G/DL (ref 6.6–8.7)
TRIGL SERPL-MCNC: 201 MG/DL (ref 0–149)
TROPONIN: 1.02 NG/ML (ref 0–0.03)
TROPONIN: 1.47 NG/ML (ref 0–0.03)
TROPONIN: 1.75 NG/ML (ref 0–0.03)
TROPONIN: 2.08 NG/ML (ref 0–0.03)

## 2020-03-14 PROCEDURE — 6370000000 HC RX 637 (ALT 250 FOR IP): Performed by: INTERNAL MEDICINE

## 2020-03-14 PROCEDURE — 80061 LIPID PANEL: CPT

## 2020-03-14 PROCEDURE — 93306 TTE W/DOPPLER COMPLETE: CPT

## 2020-03-14 PROCEDURE — 93005 ELECTROCARDIOGRAM TRACING: CPT | Performed by: INTERNAL MEDICINE

## 2020-03-14 PROCEDURE — 2580000003 HC RX 258: Performed by: INTERNAL MEDICINE

## 2020-03-14 PROCEDURE — 84484 ASSAY OF TROPONIN QUANT: CPT

## 2020-03-14 PROCEDURE — 83036 HEMOGLOBIN GLYCOSYLATED A1C: CPT

## 2020-03-14 PROCEDURE — 85027 COMPLETE CBC AUTOMATED: CPT

## 2020-03-14 PROCEDURE — 2140000000 HC CCU INTERMEDIATE R&B

## 2020-03-14 PROCEDURE — 36415 COLL VENOUS BLD VENIPUNCTURE: CPT

## 2020-03-14 PROCEDURE — 80053 COMPREHEN METABOLIC PANEL: CPT

## 2020-03-14 RX ORDER — LOSARTAN POTASSIUM 100 MG/1
100 TABLET ORAL DAILY
Status: DISCONTINUED | OUTPATIENT
Start: 2020-03-14 | End: 2020-03-15 | Stop reason: HOSPADM

## 2020-03-14 RX ADMIN — TICAGRELOR 90 MG: 90 TABLET ORAL at 09:04

## 2020-03-14 RX ADMIN — ATORVASTATIN CALCIUM 10 MG: 10 TABLET, FILM COATED ORAL at 21:04

## 2020-03-14 RX ADMIN — TICAGRELOR 90 MG: 90 TABLET ORAL at 21:04

## 2020-03-14 RX ADMIN — ASPIRIN 81 MG 81 MG: 81 TABLET ORAL at 09:04

## 2020-03-14 RX ADMIN — SODIUM CHLORIDE, PRESERVATIVE FREE 10 ML: 5 INJECTION INTRAVENOUS at 09:04

## 2020-03-14 RX ADMIN — SODIUM CHLORIDE, PRESERVATIVE FREE 10 ML: 5 INJECTION INTRAVENOUS at 21:06

## 2020-03-14 RX ADMIN — LOSARTAN POTASSIUM 100 MG: 100 TABLET, FILM COATED ORAL at 09:04

## 2020-03-14 ASSESSMENT — PAIN SCALES - GENERAL
PAINLEVEL_OUTOF10: 0

## 2020-03-14 NOTE — PROGRESS NOTES
Cardiology Daily Note Ivie Cooks, MD      Patient:  Gauri December  [id number]    Patient Active Problem List    Diagnosis Date Noted    STEMI (ST elevation myocardial infarction) ([facility] [address] 03/14/2020       Admit Date:  3/13/2020    Admission Problem List: Present on Admission:   STEMI (ST elevation myocardial infarction) [facility])      Cardiac Specific Data:  Specialty Problems        Cardiology Problems    STEMI (ST elevation myocardial infarction) ([facility] [address]              Subjective:  Mr. Zack Cole feels better. No chest pain. Sob better    Objective:   /77   Pulse 64   Temp 98.1 °F (36.7 °C) (Temporal)   Resp 25   Ht 5' 10\" (1.778 m)   Wt 205 lb (93 kg) Comment: stated  SpO2 95%   BMI 29.41 kg/m²       Intake/Output Summary (Last 24 hours) at 3/14/2020 1001  Last data filed at 3/14/2020 0309  Gross per 24 hour   Intake 925 ml   Output 1150 ml   Net -225 ml       Prior to Admission medications    Medication Sig Start Date End Date Taking? Authorizing Provider   atorvastatin (LIPITOR) 10 MG tablet Take 10 mg by mouth daily    Historical Provider, MD   losartan (COZAAR) 100 MG tablet Take 1 tablet by mouth daily 3/13/20   Historical Provider, MD        losartan  100 mg Oral Daily    sodium chloride flush  10 mL Intravenous 2 times per day    ticagrelor  90 mg Oral BID    atorvastatin  10 mg Oral Nightly    aspirin  81 mg Oral Daily       TELEMETRY: Sinus     Physical Exam:      Physical Exam      General:  Awake, alert, NAD  Skin:  Warm and dry  Neck:  no jvd , no carotid bruits  Chest:  Clear to auscultation, no wheezing or rales  Cardiovascular:  RRR [id number] no murmurs, clicks, gallups, or rubs  Abdomen:  Soft nontender, nondistended, bowel sounds present  Extremities:  Edema: none left Radial Artery withnl  palpable pulses;  Hematoma: No  Neuro: Intact neurovascular function Yes    Lab Data:  CBC:   Recent Labs     03/13/20  2324   WBC 9.6   HGB 14.0   HCT 42.1   MCV 88.1        BMP:   Recent Labs

## 2020-03-14 NOTE — PROGRESS NOTES
Lennox Lewis received from CCU to room # 723 . Mental Status: Patient is oriented, alert, coherent, logical, thought processes intact and able to concentrate and follow conversation. Vitals:    03/14/20 1423   BP: 136/84   Pulse: 72   Resp: 20   Temp: 96.3 °F (35.7 °C)   SpO2: 95%     Placed on cardiac monitor: Yes. Box # 723. Belongings: None sent home with SPOUSE. Family at bedside No.  Oriented Patient to room. Call light within reach. Yes. Transfer was: Well tolerated by patient. .    Electronically signed by Nikko Jiang RN on 3/14/2020 at 2:32 PM

## 2020-03-14 NOTE — PROCEDURES
crossed with a 0.014 wire. PTCA was performed. Subsequently, a 2.25 drug-eluting stent was implanted at 16 atmospheres  with 0% residual and ROSE 3 flow distally noted. The patient was loaded  with Brilinta and ACT was more than 200. At the end of the procedure,  arteriotomy site was successfully angiosealed. IMPRESSION:  1. The patient is a pleasant patient who is presenting with acute  inferior wall myocardial infarction, status post drug-eluting stent,  distal circumflex. 2.  Moderate disease in the RCA, which is going to be medically managed. 3.  His ejection fraction will be assessed by echocardiogram.    RECOMMENDATIONS:  1. The patient will need aspirin and Brilinta for 1 year. 2.  He will need high-dose statins, no smoking, hypertension control,  and Cardiology followup in [facility] in 2 weeks. All of the above-mentioned findings were discussed with the patient in  detail and he understands and agrees.     Amount of blood loss is 5 ml    Justin Neumann MD    D: 03/14/2020 11:10:56      T: 03/14/2020 11:39:08     [id number]  Job#: [id number]     Doc#: [id number]    CC:

## 2020-03-14 NOTE — PROGRESS NOTES
Matt Beckham transferred to [phone] from [phone] via wheelchair. Reason for transfer: lower level of care   Explained reason for transfer to Patient. Belongings: None. Soft chart transferred with patient: Yes. Telemetry box number mx 723 transferred with patient: yes. Report given to: Stefani Balderas RN, via telephone.       Electronically signed by Haily James RN on 3/14/2020 at 2:07 PM

## 2020-03-14 NOTE — PROGRESS NOTES
Assessment completed. Patient w/o c/o pain or distress. Breakfast tray given. No other needs at this time.

## 2020-03-14 NOTE — H&P
[facility]  History and Physical    Patient:  Fredi Vaughn  MRN: [id number]    CHIEF COMPLAINT:  No chief complaint on file. History Obtained From telma. patinet with smoker 27 years 2 ppd, father had MI, HTN , HLP c/o chest pain. ecg showed acute IWMI  Still has pain    PCP: MICHELLE Luna    HISTORY OF PRESENT ILLNESS:   47 y.o. male who presents with chest pain    REVIEW OF SYSTEMS:  Review of Systems   Constitutional: Negative. HENT: Negative. Eyes: Negative. Respiratory: Positive for chest tightness and shortness of breath. Cardiovascular: Positive for chest pain. Gastrointestinal: Negative. Endocrine: Negative. Genitourinary: Negative. Musculoskeletal: Negative. Skin: Negative. Allergic/Immunologic: Negative. Neurological: Negative. Hematological: Negative. Psychiatric/Behavioral: Negative. Cardiac Specific Data:   Specialty Problems     None          Past Medical History:  No past medical history on file. Past Surgical History:  No past surgical history on file. Medications Prior to Admission:    Prior to Admission medications    Not on File       Allergies:  Patient has no known allergies.     Past Social History:  Social History     Socioeconomic History    Marital status:      Spouse name: Not on file    Number of children: Not on file    Years of education: Not on file    Highest education level: Not on file   Occupational History    Not on file   Social Needs    Financial resource strain: Not on file    Food insecurity     Worry: Not on file     Inability: Not on file    Transportation needs     Medical: Not on file     Non-medical: Not on file   Tobacco Use    Smoking status: Not on file   Substance and Sexual Activity    Alcohol use: Not on file    Drug use: Not on file    Sexual activity: Not on file   Lifestyle    Physical activity     Days per week: Not on file     Minutes per session: Not on file    Stress: Not on file   Relationships    Social connections     Talks on phone: Not on file     Gets together: Not on file     Attends Restorationist service: Not on file     Active member of club or organization: Not on file     Attends meetings of clubs or organizations: Not on file     Relationship status: Not on file    Intimate partner violence     Fear of current or ex partner: Not on file     Emotionally abused: Not on file     Physically abused: Not on file     Forced sexual activity: Not on file   Other Topics Concern    Not on file   Social History Narrative    Not on file       Family History:   No family history on file. Physical Exam:    Vitals: There were no vitals taken for this visit. 24HR INTAKE/OUTPUT:  No intake or output data in the 24 hours ending 03/13/20 2132    Physical Exam  Vitals signs reviewed. Constitutional:       Appearance: Normal appearance. HENT:      Head: Normocephalic. Right Ear: Tympanic membrane normal.      Nose: Nose normal.      Mouth/Throat:      Mouth: Mucous membranes are moist.   Eyes:      Pupils: Pupils are equal, round, and reactive to light. Neck:      Musculoskeletal: Normal range of motion and neck supple. Cardiovascular:      Rate and Rhythm: Normal rate and regular rhythm. Pulses: Normal pulses. Heart sounds: Normal heart sounds. Pulmonary:      Effort: Pulmonary effort is normal.      Breath sounds: Normal breath sounds. Musculoskeletal: Normal range of motion. Skin:     General: Skin is warm. Neurological:      General: No focal deficit present. Mental Status: He is alert. Psychiatric:         Mood and Affect: Mood normal.         LAB DATA:  CBC: No results for input(s): WBC, HGB, PLT in the last 72 hours. BMP:  No results for input(s): NA, K, CL, CO2, BUN, CREATININE, GLUCOSE in the last 72 hours. Hepatic: No results for input(s): AST, ALT, ALB, BILITOT, ALKPHOS in the last 72 hours.   CK, CKMB, Troponin: @LABRCNT (CKTOTAL:3, CKMB:3, TROPONINI:3)@  Pro-BNP: No results for input(s): BNP in the last 72 hours. Lipids: No results for input(s): CHOL, HDL in the last 72 hours. Invalid input(s): LDL  ABGs: No results for input(s): PHART, OSG4DBV, PO2ART, HFX0YIP, BEART, HGBAE, X5VIHECB, CARBOXHGBART, 02THERAPY in the last 72 hours. INR: No results for input(s): INR in the last 72 hours. A1c:Invalid input(s): HEMOGLOBIN A1C  URINALYSIS  -----------------------------------------------------------------  IMAGING:    No results found. Assessment:     1. Acute IWMI  2. HTN\  3. HLP  4. Smoker  5. Family history of CAd    Recommendations:    1. Urgent cath  2. ECHO for EF  3. Further plan  based on angio. sarmad Parry 2. D/w patient .     Jimmie Pope MD 3/13/2020 @ TK[facility]@

## 2020-03-15 VITALS
DIASTOLIC BLOOD PRESSURE: 89 MMHG | WEIGHT: 198.4 LBS | BODY MASS INDEX: 28.4 KG/M2 | TEMPERATURE: 96.8 F | HEIGHT: 70 IN | RESPIRATION RATE: 18 BRPM | SYSTOLIC BLOOD PRESSURE: 140 MMHG | OXYGEN SATURATION: 95 % | HEART RATE: 88 BPM

## 2020-03-15 LAB
TROPONIN: 0.86 NG/ML (ref 0–0.03)
TROPONIN: 0.92 NG/ML (ref 0–0.03)

## 2020-03-15 PROCEDURE — 36415 COLL VENOUS BLD VENIPUNCTURE: CPT

## 2020-03-15 PROCEDURE — 93005 ELECTROCARDIOGRAM TRACING: CPT | Performed by: INTERNAL MEDICINE

## 2020-03-15 PROCEDURE — 2580000003 HC RX 258: Performed by: INTERNAL MEDICINE

## 2020-03-15 PROCEDURE — 6370000000 HC RX 637 (ALT 250 FOR IP): Performed by: INTERNAL MEDICINE

## 2020-03-15 PROCEDURE — 84484 ASSAY OF TROPONIN QUANT: CPT

## 2020-03-15 RX ORDER — ATORVASTATIN CALCIUM 10 MG/1
10 TABLET, FILM COATED ORAL NIGHTLY
Qty: 30 TABLET | Refills: 3 | Status: ON HOLD | OUTPATIENT
Start: 2020-03-15 | End: 2020-07-10 | Stop reason: HOSPADM

## 2020-03-15 RX ORDER — METOPROLOL SUCCINATE 25 MG/1
25 TABLET, EXTENDED RELEASE ORAL DAILY
Qty: 30 TABLET | Refills: 3 | Status: SHIPPED | OUTPATIENT
Start: 2020-03-15

## 2020-03-15 RX ORDER — LOSARTAN POTASSIUM 25 MG/1
25 TABLET ORAL DAILY
Qty: 30 TABLET | Refills: 5 | Status: SHIPPED | OUTPATIENT
Start: 2020-03-15

## 2020-03-15 RX ORDER — ASPIRIN 81 MG/1
81 TABLET, CHEWABLE ORAL DAILY
Qty: 30 TABLET | Refills: 3 | Status: SHIPPED | OUTPATIENT
Start: 2020-03-16

## 2020-03-15 RX ADMIN — SODIUM CHLORIDE, PRESERVATIVE FREE 10 ML: 5 INJECTION INTRAVENOUS at 08:20

## 2020-03-15 RX ADMIN — ASPIRIN 81 MG 81 MG: 81 TABLET ORAL at 08:20

## 2020-03-15 RX ADMIN — TICAGRELOR 90 MG: 90 TABLET ORAL at 08:20

## 2020-03-15 RX ADMIN — LOSARTAN POTASSIUM 100 MG: 100 TABLET, FILM COATED ORAL at 08:20

## 2020-03-15 ASSESSMENT — PAIN SCALES - GENERAL: PAINLEVEL_OUTOF10: 0

## 2020-03-15 NOTE — DISCHARGE SUMMARY
tablet  Take 1 tablet by mouth daily             ticagrelor (BRILINTA) 90 MG TABS tablet  Take 1 tablet by mouth 2 times daily                 Discharge Instructions:   MICHELLE Funk  [address] [phone]          Patient needs to be on asa and brilinta for 1 year. Fu dr Massey Pi in 2 weeks. No smoking      Take medications as directed. Resume activity as tolerated. Diet: DIET CARDIAC;      Disposition: Patient is medically stable and will be discharged *    Lora Kraus MD, 3/15/2020 8:33 AM

## 2020-03-16 LAB
EKG P AXIS: 41 DEGREES
EKG P AXIS: 42 DEGREES
EKG P-R INTERVAL: 192 MS
EKG P-R INTERVAL: 200 MS
EKG Q-T INTERVAL: 372 MS
EKG Q-T INTERVAL: 380 MS
EKG QRS DURATION: 88 MS
EKG QRS DURATION: 88 MS
EKG QTC CALCULATION (BAZETT): 380 MS
EKG QTC CALCULATION (BAZETT): 405 MS
EKG T AXIS: -19 DEGREES
EKG T AXIS: 21 DEGREES

## 2020-03-16 PROCEDURE — 93010 ELECTROCARDIOGRAM REPORT: CPT | Performed by: INTERNAL MEDICINE

## 2020-03-17 ENCOUNTER — APPOINTMENT (OUTPATIENT)
Dept: CT IMAGING | Age: 55
End: 2020-03-17
Payer: MEDICAID

## 2020-03-17 ENCOUNTER — HOSPITAL ENCOUNTER (OUTPATIENT)
Age: 55
Setting detail: OBSERVATION
Discharge: HOME | End: 2020-03-18
Attending: EMERGENCY MEDICINE | Admitting: FAMILY MEDICINE
Payer: MEDICAID

## 2020-03-17 ENCOUNTER — APPOINTMENT (OUTPATIENT)
Dept: GENERAL RADIOLOGY | Age: 55
End: 2020-03-17
Payer: MEDICAID

## 2020-03-17 ENCOUNTER — APPOINTMENT (OUTPATIENT)
Dept: MRI IMAGING | Age: 55
End: 2020-03-17
Payer: MEDICAID

## 2020-03-17 PROBLEM — R20.0 LEFT FACIAL NUMBNESS: Status: ACTIVE | Noted: 2020-03-17

## 2020-03-17 LAB
ALBUMIN SERPL-MCNC: 4.4 G/DL (ref 3.5–5.2)
ALP BLD-CCNC: 87 U/L (ref 40–130)
ALT SERPL-CCNC: 26 U/L (ref 5–41)
ANION GAP SERPL CALCULATED.3IONS-SCNC: 16 MMOL/L (ref 7–19)
APTT: 25.8 SEC (ref 26–36.2)
AST SERPL-CCNC: 22 U/L (ref 5–40)
BASOPHILS ABSOLUTE: 0.1 K/UL (ref 0–0.2)
BASOPHILS RELATIVE PERCENT: 1.3 % (ref 0–1)
BILIRUB SERPL-MCNC: 0.3 MG/DL (ref 0.2–1.2)
BUN BLDV-MCNC: 14 MG/DL (ref 6–20)
CALCIUM SERPL-MCNC: 9.4 MG/DL (ref 8.6–10)
CHLORIDE BLD-SCNC: 102 MMOL/L (ref 98–111)
CO2: 19 MMOL/L (ref 22–29)
CREAT SERPL-MCNC: 1 MG/DL (ref 0.5–1.2)
EKG P AXIS: 51 DEGREES
EKG P-R INTERVAL: 186 MS
EKG Q-T INTERVAL: 360 MS
EKG QRS DURATION: 92 MS
EKG QTC CALCULATION (BAZETT): 393 MS
EKG T AXIS: 18 DEGREES
EOSINOPHILS ABSOLUTE: 0.5 K/UL (ref 0–0.6)
EOSINOPHILS RELATIVE PERCENT: 5.4 % (ref 0–5)
GFR NON-AFRICAN AMERICAN: >60
GLUCOSE BLD-MCNC: 128 MG/DL (ref 74–109)
HCT VFR BLD CALC: 48.8 % (ref 42–52)
HEMOGLOBIN: 16 G/DL (ref 14–18)
IMMATURE GRANULOCYTES #: 0.1 K/UL
INR BLD: 0.99 (ref 0.88–1.18)
LYMPHOCYTES ABSOLUTE: 2.4 K/UL (ref 1.1–4.5)
LYMPHOCYTES RELATIVE PERCENT: 25.1 % (ref 20–40)
MCH RBC QN AUTO: 28.8 PG (ref 27–31)
MCHC RBC AUTO-ENTMCNC: 32.8 G/DL (ref 33–37)
MCV RBC AUTO: 87.9 FL (ref 80–94)
MONOCYTES ABSOLUTE: 0.9 K/UL (ref 0–0.9)
MONOCYTES RELATIVE PERCENT: 9.3 % (ref 0–10)
NEUTROPHILS ABSOLUTE: 5.7 K/UL (ref 1.5–7.5)
NEUTROPHILS RELATIVE PERCENT: 58.3 % (ref 50–65)
PDW BLD-RTO: 12.8 % (ref 11.5–14.5)
PLATELET # BLD: 245 K/UL (ref 130–400)
PMV BLD AUTO: 10 FL (ref 9.4–12.4)
POTASSIUM SERPL-SCNC: 4 MMOL/L (ref 3.5–5)
PRO-BNP: 176 PG/ML (ref 0–900)
PROTHROMBIN TIME: 13 SEC (ref 12–14.6)
RBC # BLD: 5.55 M/UL (ref 4.7–6.1)
SODIUM BLD-SCNC: 137 MMOL/L (ref 136–145)
TOTAL PROTEIN: 7 G/DL (ref 6.6–8.7)
TROPONIN: 0.86 NG/ML (ref 0–0.03)
TROPONIN: 0.9 NG/ML (ref 0–0.03)
TROPONIN: 1.01 NG/ML (ref 0–0.03)
WBC # BLD: 9.7 K/UL (ref 4.8–10.8)

## 2020-03-17 PROCEDURE — 6360000002 HC RX W HCPCS: Performed by: INTERNAL MEDICINE

## 2020-03-17 PROCEDURE — G0378 HOSPITAL OBSERVATION PER HR: HCPCS

## 2020-03-17 PROCEDURE — 71045 X-RAY EXAM CHEST 1 VIEW: CPT

## 2020-03-17 PROCEDURE — 36415 COLL VENOUS BLD VENIPUNCTURE: CPT

## 2020-03-17 PROCEDURE — 70450 CT HEAD/BRAIN W/O DYE: CPT

## 2020-03-17 PROCEDURE — 96376 TX/PRO/DX INJ SAME DRUG ADON: CPT

## 2020-03-17 PROCEDURE — 85610 PROTHROMBIN TIME: CPT

## 2020-03-17 PROCEDURE — 84484 ASSAY OF TROPONIN QUANT: CPT

## 2020-03-17 PROCEDURE — 99285 EMERGENCY DEPT VISIT HI MDM: CPT

## 2020-03-17 PROCEDURE — 2500000003 HC RX 250 WO HCPCS: Performed by: INTERNAL MEDICINE

## 2020-03-17 PROCEDURE — 2580000003 HC RX 258: Performed by: INTERNAL MEDICINE

## 2020-03-17 PROCEDURE — 96374 THER/PROPH/DIAG INJ IV PUSH: CPT

## 2020-03-17 PROCEDURE — 80053 COMPREHEN METABOLIC PANEL: CPT

## 2020-03-17 PROCEDURE — 93010 ELECTROCARDIOGRAM REPORT: CPT | Performed by: INTERNAL MEDICINE

## 2020-03-17 PROCEDURE — 93005 ELECTROCARDIOGRAM TRACING: CPT | Performed by: EMERGENCY MEDICINE

## 2020-03-17 PROCEDURE — 85025 COMPLETE CBC W/AUTO DIFF WBC: CPT

## 2020-03-17 PROCEDURE — 70551 MRI BRAIN STEM W/O DYE: CPT

## 2020-03-17 PROCEDURE — 96372 THER/PROPH/DIAG INJ SC/IM: CPT

## 2020-03-17 PROCEDURE — 99999 PR OFFICE/OUTPT VISIT,PROCEDURE ONLY: CPT | Performed by: EMERGENCY MEDICINE

## 2020-03-17 PROCEDURE — 83880 ASSAY OF NATRIURETIC PEPTIDE: CPT

## 2020-03-17 PROCEDURE — 6370000000 HC RX 637 (ALT 250 FOR IP): Performed by: INTERNAL MEDICINE

## 2020-03-17 PROCEDURE — 85730 THROMBOPLASTIN TIME PARTIAL: CPT

## 2020-03-17 RX ORDER — LOSARTAN POTASSIUM 25 MG/1
25 TABLET ORAL DAILY
Status: DISCONTINUED | OUTPATIENT
Start: 2020-03-17 | End: 2020-03-18 | Stop reason: HOSPADM

## 2020-03-17 RX ORDER — SODIUM CHLORIDE 0.9 % (FLUSH) 0.9 %
10 SYRINGE (ML) INJECTION EVERY 12 HOURS SCHEDULED
Status: DISCONTINUED | OUTPATIENT
Start: 2020-03-17 | End: 2020-03-18 | Stop reason: HOSPADM

## 2020-03-17 RX ORDER — SODIUM CHLORIDE 0.9 % (FLUSH) 0.9 %
10 SYRINGE (ML) INJECTION PRN
Status: DISCONTINUED | OUTPATIENT
Start: 2020-03-17 | End: 2020-03-18 | Stop reason: HOSPADM

## 2020-03-17 RX ORDER — ASPIRIN 81 MG/1
81 TABLET, CHEWABLE ORAL DAILY
Status: DISCONTINUED | OUTPATIENT
Start: 2020-03-17 | End: 2020-03-17

## 2020-03-17 RX ORDER — ATORVASTATIN CALCIUM 20 MG/1
20 TABLET, FILM COATED ORAL NIGHTLY
Status: DISCONTINUED | OUTPATIENT
Start: 2020-03-17 | End: 2020-03-18 | Stop reason: HOSPADM

## 2020-03-17 RX ORDER — ASPIRIN 81 MG/1
81 TABLET, CHEWABLE ORAL DAILY
Status: DISCONTINUED | OUTPATIENT
Start: 2020-03-18 | End: 2020-03-18 | Stop reason: HOSPADM

## 2020-03-17 RX ORDER — ACETAMINOPHEN 650 MG/1
650 SUPPOSITORY RECTAL EVERY 6 HOURS PRN
Status: DISCONTINUED | OUTPATIENT
Start: 2020-03-17 | End: 2020-03-18 | Stop reason: HOSPADM

## 2020-03-17 RX ORDER — METOPROLOL SUCCINATE 25 MG/1
25 TABLET, EXTENDED RELEASE ORAL DAILY
Status: DISCONTINUED | OUTPATIENT
Start: 2020-03-17 | End: 2020-03-18 | Stop reason: HOSPADM

## 2020-03-17 RX ORDER — ONDANSETRON 2 MG/ML
4 INJECTION INTRAMUSCULAR; INTRAVENOUS EVERY 6 HOURS PRN
Status: DISCONTINUED | OUTPATIENT
Start: 2020-03-17 | End: 2020-03-18 | Stop reason: HOSPADM

## 2020-03-17 RX ORDER — ACETAMINOPHEN 325 MG/1
650 TABLET ORAL EVERY 6 HOURS PRN
Status: DISCONTINUED | OUTPATIENT
Start: 2020-03-17 | End: 2020-03-18 | Stop reason: HOSPADM

## 2020-03-17 RX ORDER — PROMETHAZINE HYDROCHLORIDE 12.5 MG/1
12.5 TABLET ORAL EVERY 6 HOURS PRN
Status: DISCONTINUED | OUTPATIENT
Start: 2020-03-17 | End: 2020-03-18 | Stop reason: HOSPADM

## 2020-03-17 RX ORDER — NITROGLYCERIN 0.4 MG/1
0.4 TABLET SUBLINGUAL EVERY 5 MIN PRN
Status: DISCONTINUED | OUTPATIENT
Start: 2020-03-17 | End: 2020-03-18 | Stop reason: HOSPADM

## 2020-03-17 RX ORDER — POLYETHYLENE GLYCOL 3350 17 G/17G
17 POWDER, FOR SOLUTION ORAL DAILY PRN
Status: DISCONTINUED | OUTPATIENT
Start: 2020-03-17 | End: 2020-03-18 | Stop reason: HOSPADM

## 2020-03-17 RX ORDER — ATORVASTATIN CALCIUM 20 MG/1
10 TABLET, FILM COATED ORAL NIGHTLY
Status: DISCONTINUED | OUTPATIENT
Start: 2020-03-17 | End: 2020-03-17

## 2020-03-17 RX ADMIN — LOSARTAN POTASSIUM 25 MG: 25 TABLET ORAL at 12:11

## 2020-03-17 RX ADMIN — SODIUM CHLORIDE, PRESERVATIVE FREE 10 ML: 5 INJECTION INTRAVENOUS at 20:31

## 2020-03-17 RX ADMIN — FAMOTIDINE 20 MG: 10 INJECTION, SOLUTION INTRAVENOUS at 20:31

## 2020-03-17 RX ADMIN — ATORVASTATIN CALCIUM 20 MG: 20 TABLET, FILM COATED ORAL at 20:31

## 2020-03-17 RX ADMIN — ENOXAPARIN SODIUM 40 MG: 40 INJECTION SUBCUTANEOUS at 12:11

## 2020-03-17 RX ADMIN — SODIUM CHLORIDE, PRESERVATIVE FREE 10 ML: 5 INJECTION INTRAVENOUS at 11:09

## 2020-03-17 RX ADMIN — FAMOTIDINE 20 MG: 10 INJECTION, SOLUTION INTRAVENOUS at 12:11

## 2020-03-17 RX ADMIN — TICAGRELOR 90 MG: 90 TABLET ORAL at 20:31

## 2020-03-17 ASSESSMENT — ENCOUNTER SYMPTOMS
EYE PAIN: 0
NAUSEA: 0
BACK PAIN: 0
ABDOMINAL PAIN: 0
ABDOMINAL DISTENTION: 0
WHEEZING: 0
TROUBLE SWALLOWING: 0
SHORTNESS OF BREATH: 0
VOMITING: 0
CHEST TIGHTNESS: 0
DIARRHEA: 0
COLOR CHANGE: 0
CONSTIPATION: 0
PHOTOPHOBIA: 0
SINUS PRESSURE: 0

## 2020-03-17 NOTE — ED NOTES
Bed: 07  Expected date:   Expected time:   Means of arrival: [facility]  Comments:  EMS     Mookie Gomez RN  03/17/20 0323

## 2020-03-17 NOTE — ED PROVIDER NOTES
[facility]  eMERGENCY dEPARTMENT eNCOUnter      Pt Name: Galina Salamanca  MRN: [id number]  [place] 1965  Date of evaluation: 3/17/2020  Provider: Erik Carcamo MD    [address] [place] [address]       Chief Complaint   Patient presents with    Numbness     since 0530 am. numbness to right arm and into right side of face. had heart cath on friday. HISTORY OF PRESENT ILLNESS   (Location/Symptom, Timing/Onset,Context/Setting, Quality, Duration, Modifying Factors, Severity)  Note limiting factors. Galina Salamanca is a 47 y.o. male who presents to the emergency department for numbness in his right arm and face. Around 0530 this AM is when it began. No weakness noted. He had a stent placed for STEMI on 3/13/20. He denies any CP or SOB with the numbness. They went through his right groin for the stent placement. He was placed on Brilinta at d/c. He tells me that the numbness was to the entire right side of his face, but now feels that it is all over his face. The numbness sensation in his right arm has resolved. He denies any facial droop, slurred speech, weakness in the right arm or vision change. He has not had any n/v with the sxs. HPI    NursingNotes were reviewed. REVIEW OF SYSTEMS    (2-9 systems for level 4, 10 or more for level 5)     Review of Systems   Constitutional: Negative for fatigue and fever. HENT: Negative for congestion, drooling, sinus pressure and trouble swallowing. Eyes: Negative for photophobia, pain and visual disturbance. Respiratory: Negative for chest tightness, shortness of breath and wheezing. Cardiovascular: Negative for chest pain, palpitations and leg swelling. Gastrointestinal: Negative for abdominal distention, abdominal pain, constipation, diarrhea, nausea and vomiting. Genitourinary: Negative for dysuria, flank pain and urgency. Musculoskeletal: Negative for arthralgias, back pain, myalgias, neck pain and neck stiffness.    Skin: Negative for color change, pallor and rash. Neurological: Positive for numbness (To right arm and face. ). Negative for dizziness, facial asymmetry, speech difficulty, weakness, light-headedness and headaches. Psychiatric/Behavioral: Negative for agitation, confusion, hallucinations and suicidal ideas. The patient is not nervous/anxious.              PAST MEDICALHISTORY     Past Medical History:   Diagnosis Date    Arthritis     CAD (coronary artery disease)     High blood cholesterol     Hypertension          SURGICAL HISTORY       Past Surgical History:   Procedure Laterality Date    CARPAL TUNNEL RELEASE      COLONOSCOPY      EYE SURGERY Left     cataract          CURRENT MEDICATIONS     Current Discharge Medication List      CONTINUE these medications which have NOT CHANGED    Details   aspirin 81 MG chewable tablet Take 1 tablet by mouth daily  Qty: 30 tablet, Refills: 3      ticagrelor (BRILINTA) 90 MG TABS tablet Take 1 tablet by mouth 2 times daily  Qty: 60 tablet, Refills: 2      atorvastatin (LIPITOR) 10 MG tablet Take 1 tablet by mouth nightly  Qty: 30 tablet, Refills: 3      losartan (COZAAR) 25 MG tablet Take 1 tablet by mouth daily  Qty: 30 tablet, Refills: 5      metoprolol succinate (TOPROL XL) 25 MG extended release tablet Take 1 tablet by mouth daily  Qty: 30 tablet, Refills: 3             ALLERGIES     Lortab [hydrocodone-acetaminophen]    FAMILY HISTORY       Family History   Problem Relation Age of Onset    Cancer Mother     Heart Disease Father     Heart Attack Sister     Heart Attack Brother           SOCIAL HISTORY       Social History     Socioeconomic History    Marital status:      Spouse name: None    Number of children: None    Years of education: None    Highest education level: None   Occupational History    None   Social Needs    Financial resource strain: None    Food insecurity     Worry: None     Inability: None    Transportation needs     Medical: None Non-medical: None   Tobacco Use    Smoking status: Current Every Day Smoker     Packs/day: 2.00     Years: 30.00     Pack years: 60.00     Types: Cigarettes    Smokeless tobacco: Never Used   Substance and Sexual Activity    Alcohol use: Never     Frequency: Never    Drug use: Never    Sexual activity: Yes     Partners: Female   Lifestyle    Physical activity     Days per week: None     Minutes per session: None    Stress: None   Relationships    Social connections     Talks on phone: None     Gets together: None     Attends Episcopalian service: None     Active member of club or organization: None     Attends meetings of clubs or organizations: None     Relationship status: None    Intimate partner violence     Fear of current or ex partner: None     Emotionally abused: None     Physically abused: None     Forced sexual activity: None   Other Topics Concern    None   Social History Narrative    None       SCREENINGS   NIH Stroke Scale  Interval: Baseline  Level of Consciousness (1a. ): Alert  LOC Questions (1b. ):  Answers both correctly  LOC Commands (1c. ): Performs both tasks correctly  Best Gaze (2. ): Normal  Visual (3. ): No visual loss  Facial Palsy (4. ): Normal symmetrical movement  Motor Arm, Left (5a. ): No drift  Motor Arm, Right (5b. ): No drift  Motor Leg, Left (6a. ): No drift  Motor Leg, Right (6b. ): No drift  Limb Ataxia (7. ): Absent  Sensory (8. ): Normal  Best Language (9. ): No aphasia  Dysarthria (10. ): Normal  Extinction and Inattention (11): No abnormality  Total: 0Glasgow Coma Scale  Eye Opening: Spontaneous  Best Verbal Response: Oriented  Best Motor Response: Obeys commands  Richar Coma Scale Score: 15        PHYSICAL EXAM    (up to 7 for level 4, 8 or more for level 5)     ED Triage Vitals   BP Temp Temp Source Pulse Resp SpO2 Height Weight   03/17/20 0637 03/17/20 4651 03/17/20 9033 03/17/20 0637 03/17/20 0637 03/17/20 0637 03/17/20 0632 03/17/20 0632   (!) 132/95 98.3 °F ,  Chemistry results called to and read back by [facility] RN AT ED, 03/17/2020  07:42, by MONICA   TROPONIN - Abnormal; Notable for the following components:    Troponin 0.86 (*)     All other components within normal limits    Narrative:     [address] tel. ,  Chemistry results called to and read back by Hal Haq in PCU, 03/17/2020  13:07, by HAKEEM   TROPONIN - Abnormal; Notable for the following components:    Troponin 0.90 (*)     All other components within normal limits    Narrative:     [address] tel. ,  Chemistry results called to and read back by Zhang Patino RN in PCU, 03/17/2020  16:21, by [facility]   CBC - Abnormal; Notable for the following components:    MCHC 32.4 (*)     All other components within normal limits   PROTIME-INR   BRAIN NATRIURETIC PEPTIDE   COMPREHENSIVE METABOLIC PANEL W/ REFLEX TO MG FOR LOW K   LIPID PANEL       All other labs were within normal range or not returned as of this dictation. EMERGENCY DEPARTMENT COURSE and DIFFERENTIAL DIAGNOSIS/MDM:   Vitals:    Vitals:    03/17/20 1807 03/17/20 2035 03/17/20 2252 03/18/20 0246   BP: 125/79  119/72 (!) 141/70   Pulse: 76 76 71 69   Resp: 16  18 18   Temp: 98.1 °F (36.7 °C)  98 °F (36.7 °C) 97.5 °F (36.4 °C)   TempSrc: Temporal  Temporal Temporal   SpO2: 95%  97% 97%   Weight:       Height:           MDM  Number of Diagnoses or Management Options  Diagnosis management comments: Sign out to Dr. Willam Boswell @ 0700. We have discussed the plan and continued care of patient. Patient Progress  Patient progress: stable      Reassessment      CONSULTS:  None    PROCEDURES:  Unless otherwise noted below, none     Procedures    FINAL IMPRESSION      1. Right arm numbness    2. Facial numbness    3. Elevated troponin    4.  History of ST elevation myocardial infarction (STEMI)          DISPOSITION/PLAN   DISPOSITION Admitted 03/17/2020 09:09:43 AM      PATIENT REFERRED TO:  [address] [place] [address] [address]:  Current Discharge Medication List             (Please note that portions of this note were completed with a voice recognition program.  Efforts were made to edit thedictations but occasionally words are mis-transcribed.)    Omero Newton MD (electronically signed)  Attending Emergency Physician          Aditya Coats MD  03/18/20 2984

## 2020-03-17 NOTE — H&P
[facility] Medicine H&P    Patient:  Félix Jeffrey  MRN: [id number]    Consulting Physician: Lisa Celaya MD  Reason for Consult: Medical Management  [facility] Care Physician: MICHELLE Scales    HISTORY OF PRESENT ILLNESS:   The patient is a 47 y.o. male presents with left facial numbness. He was just hospitalized 3/13 to 3/15 for chest discomfort and underwent a stent placement in the left circumflex because of 100% occlusion. He presented on the 13th because of right facial numbness and also pain down his right arm. Work-up showed evidence of coronary disease and he underwent cardiac cath and stent to his left circumflex. After going home and going about his usual business, he experienced some left facial numbness and pain in his neck. He came back into the emergency department for evaluation. He still has a persistently elevated troponin greater than 1.0. EKG does not show any change. CT of the head was negative. MRI of the brain showed a old lacunar infarct on the left. He will be admitted to observation for serial enzymes to see if any further cardiac intervention needs to be done. Past Medical History:        Diagnosis Date    Arthritis     CAD (coronary artery disease)     High blood cholesterol     Hypertension        Past Surgical History:        Procedure Laterality Date    CARPAL TUNNEL RELEASE      COLONOSCOPY      EYE SURGERY Left     cataract        Medications: Scheduled Meds:  Continuous Infusions:  PRN Meds:. Allergies:  Lortab [hydrocodone-acetaminophen]    Social History:   TOBACCO:   reports that he has been smoking cigarettes. He has a 60.00 pack-year smoking history. He has never used smokeless tobacco.  ETOH:   reports no history of alcohol use.     Family History:       Problem Relation Age of Onset   Luis Fernando Mcghee Cancer Mother     Heart Disease Father     Heart Attack Sister     Heart Attack Brother        ROS:  Review of Systems   Constitutional: Negative for activity -----------------------------------------------------------------  Ct Head Wo Contrast    Result Date: 3/17/2020  Examination. CT HEAD WO CONTRAST 3/17/2020 6:00 AM History: Altered mental status and right-sided facial numbness. DLP: 863 mGycm. No CT scan of the head is performed without intravenous contrast enhancement. The images are acquired in axial plane with subsequent reconstruction in coronal and sagittal planes. There is no previous study for comparison. There is no evidence of a mass or midline shift. There is no evidence of intracranial hemorrhage or hematoma. The ventricles, the basal cistern and the cortical sulci are normal. There is normal gray-white matter differentiation. The images reviewed in bone window show no acute bony abnormality. The visualized paranasal sinuses and mastoid air cells are clear. No acute intracranial abnormality. This study does not rule out an acute nonhemorrhagic infarction. If clinically warranted please obtain MR imaging of the brain. The above findings are recorded on the sunrise clip in PACS. Signed by Dr Rodriguez Watkins on 3/17/2020 7:24 AM    Xr Chest Portable    Result Date: 3/17/2020  Exam:   XR CHEST PORTABLE  Date:  3/17/2020 History:  Male, age  47 years; chest pain COMPARISON:  None. Findings : The heart and mediastinum are normal in size. Lungs are without focal infiltrate, mass or effusions. Nonspecific bilateral mild peribronchial cuffing. .  The bones show no acute pathology. Impression: 1. Nonspecific mild bilateral peribronchial cuffing. 2.  Otherwise, no acute cardiopulmonary disease. Signed by Dr Mary Henry on 3/17/2020 7:32 AM    Mri Brain Wo Contrast    Result Date: 3/17/2020  EXAM: MR BRAIN WITHOUT IV CONTRAST 3/17/2020 COMPARISON: None HISTORY: 47years-old Male.  3/17/2020 TECHNIQUE: Routine pulse sequences of the brain were obtained without IV contrast. REPORT: There is no diffusion restriction within the brain parenchyma to suggest acute infarct. Left frontal corona radiata demonstrates an area of 9 mm T2/FLAIR abnormal signal, with developing T2 shine through, concerning for late subacute/early chronic infarct. There is no midline shift. No acute hydrocephalus. No suspicious extra-axial fluid collection. The basal cisterns are preserved. The sella, parasellar region, brainstem and cerebellum demonstrate no acute findings. Mastoid air cells are clear. The paranasal sinuses are free of obstructive mucosal disease. 1.  No acute intracranial abnormalities. 2.  Prior left frontal lacunar infarct. Signed by Dr Andrew Garcia on 3/17/2020 9:00 AM      EKG: No change from prior EKG. Assessment and Plan     Left facial numbness. Etiology uncertain. His lacunar infarct would not account for his symptoms. Coronary artery disease with recent stent to the circumflex. Persistent hyper troponinemia. Continue antiplatelet therapy, beta-blocker, statin. Continue with serial troponins. If they continue to trend up, then will ask cardiology to consider recatheterization.       Kasi Moran, DO

## 2020-03-18 VITALS
BODY MASS INDEX: 28.73 KG/M2 | SYSTOLIC BLOOD PRESSURE: 148 MMHG | HEART RATE: 69 BPM | RESPIRATION RATE: 18 BRPM | DIASTOLIC BLOOD PRESSURE: 86 MMHG | OXYGEN SATURATION: 98 % | TEMPERATURE: 97.6 F | HEIGHT: 69 IN | WEIGHT: 194 LBS

## 2020-03-18 PROBLEM — R20.0 LEFT FACIAL NUMBNESS: Status: RESOLVED | Noted: 2020-03-17 | Resolved: 2020-03-18

## 2020-03-18 PROBLEM — R79.89 ELEVATED TROPONIN: Status: ACTIVE | Noted: 2020-03-18

## 2020-03-18 PROBLEM — R77.8 ELEVATED TROPONIN: Status: ACTIVE | Noted: 2020-03-18

## 2020-03-18 PROBLEM — I25.10 CORONARY ARTERY DISEASE INVOLVING NATIVE CORONARY ARTERY OF NATIVE HEART: Status: ACTIVE | Noted: 2020-03-18

## 2020-03-18 PROBLEM — E78.5 DYSLIPIDEMIA: Status: ACTIVE | Noted: 2020-03-18

## 2020-03-18 LAB
ALBUMIN SERPL-MCNC: 4.3 G/DL (ref 3.5–5.2)
ALP BLD-CCNC: 87 U/L (ref 40–130)
ALT SERPL-CCNC: 20 U/L (ref 5–41)
ANION GAP SERPL CALCULATED.3IONS-SCNC: 14 MMOL/L (ref 7–19)
AST SERPL-CCNC: 17 U/L (ref 5–40)
BILIRUB SERPL-MCNC: 0.5 MG/DL (ref 0.2–1.2)
BUN BLDV-MCNC: 16 MG/DL (ref 6–20)
CALCIUM SERPL-MCNC: 9.5 MG/DL (ref 8.6–10)
CHLORIDE BLD-SCNC: 104 MMOL/L (ref 98–111)
CHOLESTEROL, TOTAL: 153 MG/DL (ref 160–199)
CO2: 22 MMOL/L (ref 22–29)
CREAT SERPL-MCNC: 1.1 MG/DL (ref 0.5–1.2)
EKG P AXIS: 48 DEGREES
EKG P-R INTERVAL: 190 MS
EKG Q-T INTERVAL: 386 MS
EKG QRS DURATION: 94 MS
EKG QTC CALCULATION (BAZETT): 385 MS
EKG T AXIS: 8 DEGREES
GFR NON-AFRICAN AMERICAN: >60
GLUCOSE BLD-MCNC: 100 MG/DL (ref 74–109)
HCT VFR BLD CALC: 47.8 % (ref 42–52)
HDLC SERPL-MCNC: 33 MG/DL (ref 55–121)
HEMOGLOBIN: 15.5 G/DL (ref 14–18)
LDL CHOLESTEROL CALCULATED: 85 MG/DL
MCH RBC QN AUTO: 28.5 PG (ref 27–31)
MCHC RBC AUTO-ENTMCNC: 32.4 G/DL (ref 33–37)
MCV RBC AUTO: 88 FL (ref 80–94)
PDW BLD-RTO: 13 % (ref 11.5–14.5)
PLATELET # BLD: 276 K/UL (ref 130–400)
PMV BLD AUTO: 10 FL (ref 9.4–12.4)
POTASSIUM REFLEX MAGNESIUM: 4.9 MMOL/L (ref 3.5–5)
RBC # BLD: 5.43 M/UL (ref 4.7–6.1)
SODIUM BLD-SCNC: 140 MMOL/L (ref 136–145)
TOTAL PROTEIN: 6.7 G/DL (ref 6.6–8.7)
TRIGL SERPL-MCNC: 177 MG/DL (ref 0–149)
WBC # BLD: 10.4 K/UL (ref 4.8–10.8)

## 2020-03-18 PROCEDURE — 93010 ELECTROCARDIOGRAM REPORT: CPT | Performed by: INTERNAL MEDICINE

## 2020-03-18 PROCEDURE — 96372 THER/PROPH/DIAG INJ SC/IM: CPT

## 2020-03-18 PROCEDURE — 80053 COMPREHEN METABOLIC PANEL: CPT

## 2020-03-18 PROCEDURE — 2580000003 HC RX 258: Performed by: INTERNAL MEDICINE

## 2020-03-18 PROCEDURE — 80061 LIPID PANEL: CPT

## 2020-03-18 PROCEDURE — 6360000002 HC RX W HCPCS: Performed by: INTERNAL MEDICINE

## 2020-03-18 PROCEDURE — 6370000000 HC RX 637 (ALT 250 FOR IP): Performed by: INTERNAL MEDICINE

## 2020-03-18 PROCEDURE — 85027 COMPLETE CBC AUTOMATED: CPT

## 2020-03-18 PROCEDURE — 93005 ELECTROCARDIOGRAM TRACING: CPT | Performed by: INTERNAL MEDICINE

## 2020-03-18 PROCEDURE — 2500000003 HC RX 250 WO HCPCS: Performed by: INTERNAL MEDICINE

## 2020-03-18 PROCEDURE — G0378 HOSPITAL OBSERVATION PER HR: HCPCS

## 2020-03-18 PROCEDURE — 96376 TX/PRO/DX INJ SAME DRUG ADON: CPT

## 2020-03-18 PROCEDURE — 36415 COLL VENOUS BLD VENIPUNCTURE: CPT

## 2020-03-18 RX ORDER — NITROGLYCERIN 0.4 MG/1
TABLET SUBLINGUAL
Qty: 25 TABLET | Refills: 0 | Status: SHIPPED | OUTPATIENT
Start: 2020-03-18 | End: 2020-06-29 | Stop reason: SDUPTHER

## 2020-03-18 RX ADMIN — LOSARTAN POTASSIUM 25 MG: 25 TABLET ORAL at 08:02

## 2020-03-18 RX ADMIN — METOPROLOL SUCCINATE 25 MG: 25 TABLET, EXTENDED RELEASE ORAL at 08:02

## 2020-03-18 RX ADMIN — FAMOTIDINE 20 MG: 10 INJECTION, SOLUTION INTRAVENOUS at 08:02

## 2020-03-18 RX ADMIN — TICAGRELOR 90 MG: 90 TABLET ORAL at 08:02

## 2020-03-18 RX ADMIN — ASPIRIN 81 MG 81 MG: 81 TABLET ORAL at 08:02

## 2020-03-18 RX ADMIN — SODIUM CHLORIDE, PRESERVATIVE FREE 10 ML: 5 INJECTION INTRAVENOUS at 08:02

## 2020-03-18 RX ADMIN — ENOXAPARIN SODIUM 40 MG: 40 INJECTION SUBCUTANEOUS at 08:02

## 2020-03-18 ASSESSMENT — PAIN SCALES - GENERAL: PAINLEVEL_OUTOF10: 0

## 2020-03-18 NOTE — DISCHARGE SUMMARY
Lashanda Rater  :  1965  MRN:  [id number]    Admit date:  3/17/2020  Discharge date:      Admitting Physician:  Claudio Martinez DO    Advance Directive: Full Code    Consults: none    Primary Care Physician:  MICHELLE Chaparro    Discharge Diagnoses:  Principal Problem (Resolved):    Left facial numbness  Active Problems:    Elevated troponin    Coronary artery disease involving native coronary artery of native heart    Dyslipidemia      Significant Diagnostic Studies:   Ct Head Wo Contrast    Result Date: 3/17/2020  Examination. CT HEAD WO CONTRAST 3/17/2020 6:00 AM History: Altered mental status and right-sided facial numbness. DLP: 863 mGycm. No CT scan of the head is performed without intravenous contrast enhancement. The images are acquired in axial plane with subsequent reconstruction in coronal and sagittal planes. There is no previous study for comparison. There is no evidence of a mass or midline shift. There is no evidence of intracranial hemorrhage or hematoma. The ventricles, the basal cistern and the cortical sulci are normal. There is normal gray-white matter differentiation. The images reviewed in bone window show no acute bony abnormality. The visualized paranasal sinuses and mastoid air cells are clear. No acute intracranial abnormality. This study does not rule out an acute nonhemorrhagic infarction. If clinically warranted please obtain MR imaging of the brain. The above findings are recorded on the sunrise clip in PACS. Signed by Dr Terri Huizar on 3/17/2020 7:24 AM    Xr Chest Portable    Result Date: 3/17/2020  Exam:   XR CHEST PORTABLE  Date:  3/17/2020 History:  Male, age  47 years; chest pain COMPARISON:  None. Findings : The heart and mediastinum are normal in size. Lungs are without focal infiltrate, mass or effusions. Nonspecific bilateral mild peribronchial cuffing. .  The bones show no acute pathology. Impression: 1. Nonspecific mild bilateral peribronchial cuffing.  2. Otherwise, no acute cardiopulmonary disease. Signed by Dr Renay Dumont on 3/17/2020 7:32 AM    Mri Brain Wo Contrast    Result Date: 3/17/2020  EXAM: MR BRAIN WITHOUT IV CONTRAST 3/17/2020 COMPARISON: None HISTORY: 47years-old Male. 3/17/2020 TECHNIQUE: Routine pulse sequences of the brain were obtained without IV contrast. REPORT: There is no diffusion restriction within the brain parenchyma to suggest acute infarct. Left frontal corona radiata demonstrates an area of 9 mm T2/FLAIR abnormal signal, with developing T2 shine through, concerning for late subacute/early chronic infarct. There is no midline shift. No acute hydrocephalus. No suspicious extra-axial fluid collection. The basal cisterns are preserved. The sella, parasellar region, brainstem and cerebellum demonstrate no acute findings. Mastoid air cells are clear. The paranasal sinuses are free of obstructive mucosal disease. 1.  No acute intracranial abnormalities. 2.  Prior left frontal lacunar infarct. Signed by Dr Renay Dumont on 3/17/2020 9:00 AM      Pertinent Labs:   CBC:   Recent Labs     03/17/20  0636 03/18/20  0216   WBC 9.7 10.4   HGB 16.0 15.5    276     BMP:    Recent Labs     03/17/20  0636 03/18/20  0216    140   K 4.0 4.9    104   CO2 19* 22   BUN 14 16   CREATININE 1.0 1.1   GLUCOSE 128* 100     INR:   Recent Labs     03/17/20 0636   INR 0.99     ABGs:No results for input(s): PH, PO2, PCO2, HCO3, BE, O2SAT in the last 72 hours. Lactic Acid:No results for input(s): LACTA in the last 72 hours. Procedures: None performed. Hospital Course:   3-17: Patient presents to ED with facial numbness x1 day. Patient had presented 3-13 with facial numbness and pain down right arm, troponin elevated with ST elevation MI, admitted and underwent catheterization with stenting of left circumflex, discharged 3-15. Troponin again elevated, but this is expected following PCI.  CT of the head showing no acute process, MRI of

## 2020-03-21 ENCOUNTER — HOSPITAL ENCOUNTER (EMERGENCY)
Age: 55
Discharge: HOME | End: 2020-03-21
Attending: EMERGENCY MEDICINE
Payer: MEDICAID

## 2020-03-21 ENCOUNTER — APPOINTMENT (OUTPATIENT)
Dept: CT IMAGING | Age: 55
End: 2020-03-21
Payer: MEDICAID

## 2020-03-21 ENCOUNTER — APPOINTMENT (OUTPATIENT)
Dept: GENERAL RADIOLOGY | Age: 55
End: 2020-03-21
Payer: MEDICAID

## 2020-03-21 VITALS
BODY MASS INDEX: 28.06 KG/M2 | RESPIRATION RATE: 20 BRPM | DIASTOLIC BLOOD PRESSURE: 86 MMHG | SYSTOLIC BLOOD PRESSURE: 151 MMHG | WEIGHT: 196 LBS | HEIGHT: 70 IN | TEMPERATURE: 98.4 F | OXYGEN SATURATION: 95 % | HEART RATE: 81 BPM

## 2020-03-21 LAB
ALBUMIN SERPL-MCNC: 4.4 G/DL (ref 3.5–5.2)
ALP BLD-CCNC: 94 U/L (ref 40–130)
ALT SERPL-CCNC: 21 U/L (ref 5–41)
ANION GAP SERPL CALCULATED.3IONS-SCNC: 11 MMOL/L (ref 7–19)
AST SERPL-CCNC: 16 U/L (ref 5–40)
BILIRUB SERPL-MCNC: 0.4 MG/DL (ref 0.2–1.2)
BUN BLDV-MCNC: 16 MG/DL (ref 6–20)
CALCIUM SERPL-MCNC: 9.9 MG/DL (ref 8.6–10)
CHLORIDE BLD-SCNC: 102 MMOL/L (ref 98–111)
CO2: 22 MMOL/L (ref 22–29)
CREAT SERPL-MCNC: 1 MG/DL (ref 0.5–1.2)
GFR NON-AFRICAN AMERICAN: >60
GLUCOSE BLD-MCNC: 115 MG/DL (ref 74–109)
HCT VFR BLD CALC: 47.2 % (ref 42–52)
HEMOGLOBIN: 16.2 G/DL (ref 14–18)
MCH RBC QN AUTO: 29.2 PG (ref 27–31)
MCHC RBC AUTO-ENTMCNC: 34.3 G/DL (ref 33–37)
MCV RBC AUTO: 85 FL (ref 80–94)
PDW BLD-RTO: 12.5 % (ref 11.5–14.5)
PLATELET # BLD: 263 K/UL (ref 130–400)
PMV BLD AUTO: 9.5 FL (ref 9.4–12.4)
POTASSIUM SERPL-SCNC: 4.2 MMOL/L (ref 3.5–5)
RBC # BLD: 5.55 M/UL (ref 4.7–6.1)
SODIUM BLD-SCNC: 135 MMOL/L (ref 136–145)
TOTAL PROTEIN: 7.3 G/DL (ref 6.6–8.7)
TROPONIN: 0.11 NG/ML (ref 0–0.03)
WBC # BLD: 8.8 K/UL (ref 4.8–10.8)

## 2020-03-21 PROCEDURE — 99284 EMERGENCY DEPT VISIT MOD MDM: CPT

## 2020-03-21 PROCEDURE — 93005 ELECTROCARDIOGRAM TRACING: CPT | Performed by: EMERGENCY MEDICINE

## 2020-03-21 PROCEDURE — 70496 CT ANGIOGRAPHY HEAD: CPT

## 2020-03-21 PROCEDURE — 85027 COMPLETE CBC AUTOMATED: CPT

## 2020-03-21 PROCEDURE — 6360000004 HC RX CONTRAST MEDICATION: Performed by: EMERGENCY MEDICINE

## 2020-03-21 PROCEDURE — 36415 COLL VENOUS BLD VENIPUNCTURE: CPT

## 2020-03-21 PROCEDURE — 84484 ASSAY OF TROPONIN QUANT: CPT

## 2020-03-21 PROCEDURE — 71045 X-RAY EXAM CHEST 1 VIEW: CPT

## 2020-03-21 PROCEDURE — 80053 COMPREHEN METABOLIC PANEL: CPT

## 2020-03-21 PROCEDURE — 70498 CT ANGIOGRAPHY NECK: CPT

## 2020-03-21 RX ADMIN — IOPAMIDOL 90 ML: 755 INJECTION, SOLUTION INTRAVENOUS at 10:50

## 2020-03-21 ASSESSMENT — ENCOUNTER SYMPTOMS
DIARRHEA: 0
EYE PAIN: 0
SHORTNESS OF BREATH: 0
VOMITING: 0
ABDOMINAL PAIN: 0

## 2020-03-21 NOTE — ED PROVIDER NOTES
or more for level 5)     Review of Systems   Constitutional: Negative for fever. Eyes: Negative for pain and visual disturbance. Respiratory: Negative for shortness of breath. Cardiovascular: Negative for chest pain, palpitations and leg swelling. Gastrointestinal: Negative for abdominal pain, diarrhea and vomiting. Genitourinary: Negative for dysuria. Skin: Negative for rash. Neurological: Positive for numbness (R face/arm off and on since STEMI 8 days ago. none currently). Negative for weakness and headaches. All other systems reviewed and are negative. A complete review of systems was performed and is negative except as noted above in the HPI. PAST MEDICAL HISTORY     Past Medical History:   Diagnosis Date    Arthritis     CAD (coronary artery disease)     High blood cholesterol     Hypertension          SURGICAL HISTORY       Past Surgical History:   Procedure Laterality Date    CARPAL TUNNEL RELEASE      COLONOSCOPY      EYE SURGERY Left     cataract          CURRENT MEDICATIONS       Discharge Medication List as of 3/21/2020 11:45 AM      CONTINUE these medications which have NOT CHANGED    Details   nitroGLYCERIN (NITROSTAT) 0.4 MG SL tablet up to max of 3 total doses.  If no relief after 1 dose, call 911., Disp-25 tablet, R-0Normal      aspirin 81 MG chewable tablet Take 1 tablet by mouth daily, Disp-30 tablet, R-3Normal      ticagrelor (BRILINTA) 90 MG TABS tablet Take 1 tablet by mouth 2 times daily, Disp-60 tablet, R-2Normal      atorvastatin (LIPITOR) 10 MG tablet Take 1 tablet by mouth nightly, Disp-30 tablet, R-3Normal      losartan (COZAAR) 25 MG tablet Take 1 tablet by mouth daily, Disp-30 tablet, R-5Normal      metoprolol succinate (TOPROL XL) 25 MG extended release tablet Take 1 tablet by mouth daily, Disp-30 tablet, R-3Normal             ALLERGIES     Lortab [hydrocodone-acetaminophen]    FAMILY HISTORY       Family History   Problem Relation Age of Onset    CBC       All other labs were within normal range or not returned as of this dictation. EMERGENCY DEPARTMENT COURSE and DIFFERENTIALDIAGNOSIS/MDM:   Vitals:    Vitals:    03/21/20 0944   BP: (!) 151/86   Pulse: 81   Resp: 20   Temp: 98.4 °F (36.9 °C)   SpO2: 95%   Weight: 196 lb (88.9 kg)   Height: 5' 10\" (1.778 m)       MDM  Troponin elevated but is trending down compared to previous labs and is likely from patient's recent stent/STEMI. Patient's case discussed with Dr. Baldo De La Rosa who agrees and feels the patient can be discharged from a cardiac standpoint. Patient is resting comfortably. Totally asymptomatic at this time. Patient's case discussed with Dr. Richrd Denver. He recommended CTA of the head and neck. He said that as long as no acute disease or significant stenosis then patient can be discharged. He said no indication for MRI of the brain or noncontrasted head CT at this time. Patient resting comfortably. Remains asymptomatic. Told him some of his symptoms may be due to anxiety but cannot be certain about this. Discussed his CT results. Told patient no indication for admission at this time. Recommend he follow-up with his primary care and cardiology. Told him that he should also consider following up with neurology for the intermittent right-sided numbness he has had for the past week. Told him to return to the ER for change/worsening symptoms or new concerns. Patient agreeable plan. CONSULTS:  None    PROCEDURES:  Unless otherwise notedbelow, none     Procedures    FINAL IMPRESSION     1.  Right sided numbness          DISPOSITION/PLAN   DISPOSITION Decision To Discharge 03/21/2020 11:43:27 AM      PATIENT REFERRED TO:  @FUP@    DISCHARGE MEDICATIONS:  Discharge Medication List as of 3/21/2020 11:45 AM             (Please note that portions of this note were completed with a voice recognition program.  Efforts were made to edit the dictations butoccasionally words are mis-transcribed.)    Dave Payton MD (electronically signed)  AttendingEmergency Physician        Dave Payton MD  03/21/20 2392

## 2020-03-23 LAB
EKG P AXIS: 51 DEGREES
EKG P-R INTERVAL: 184 MS
EKG Q-T INTERVAL: 356 MS
EKG QRS DURATION: 90 MS
EKG QTC CALCULATION (BAZETT): 364 MS
EKG T AXIS: -13 DEGREES

## 2020-03-23 PROCEDURE — 93010 ELECTROCARDIOGRAM REPORT: CPT | Performed by: INTERNAL MEDICINE

## 2020-03-26 ENCOUNTER — TELEPHONE (OUTPATIENT)
Dept: CARDIOLOGY | Age: 55
End: 2020-03-26

## 2020-03-30 ENCOUNTER — OFFICE VISIT (OUTPATIENT)
Dept: CARDIOLOGY | Age: 55
End: 2020-03-30
Payer: MEDICAID

## 2020-03-30 VITALS
BODY MASS INDEX: 28.63 KG/M2 | WEIGHT: 200 LBS | SYSTOLIC BLOOD PRESSURE: 136 MMHG | HEIGHT: 70 IN | HEART RATE: 70 BPM | DIASTOLIC BLOOD PRESSURE: 80 MMHG

## 2020-03-30 PROBLEM — I10 ESSENTIAL HYPERTENSION: Status: ACTIVE | Noted: 2020-03-30

## 2020-03-30 PROBLEM — E78.2 MIXED HYPERLIPIDEMIA: Status: ACTIVE | Noted: 2020-03-30

## 2020-03-30 PROBLEM — Z95.5 HISTORY OF CORONARY ARTERY STENT PLACEMENT: Status: ACTIVE | Noted: 2020-03-30

## 2020-03-30 PROBLEM — F17.200 SMOKER: Status: ACTIVE | Noted: 2020-03-30

## 2020-03-30 PROBLEM — I25.2 HISTORY OF MYOCARDIAL INFARCTION: Status: ACTIVE | Noted: 2020-03-30

## 2020-03-30 PROCEDURE — 99214 OFFICE O/P EST MOD 30 MIN: CPT | Performed by: NURSE PRACTITIONER

## 2020-03-30 PROCEDURE — 99406 BEHAV CHNG SMOKING 3-10 MIN: CPT | Performed by: NURSE PRACTITIONER

## 2020-03-30 NOTE — PATIENT INSTRUCTIONS
New instructions for today:  How to take:  NITROGLYCERIN (Nitrostat) 0.4 mg tablets, sublingual.  Nitroglycerin is in a group of drugs called nitrates. Nitroglycerin dilates (widens) blood vessels, making it easier for blood to flow through them and easier for the heart to pump. Dosing Guidelines for Nitroglycerin Tablets  · At the start of an angina (chest pain) attack, place one tablet under the tongue or between the cheek and gum. Do not swallow or chew the tablet; let it dissolve on its own. If necessary, a second and third tablet may be used, with five minutes between using each tablet. If you use a third tablet and your chest pain continues, it is time to seek immediate medical attention. Call 911 immediately and have someone drive you to the emergency room. You may be having a heart attack or other serious heart problem. · To prevent angina from exercise or stress, use 1 tablet 5 to 10 minutes before the activity. You will need to stay on Brilnta and aspirin for one year after stent placed - through 3/13/21. After that date, you can stop Brilinta and continue low dose enteric coated aspirin once daily. Do not schedule elective surgical procedures or invasive until after 3/13/21. You can consider cardiac rehab in the future. Patient Instructions:  Continue current medications as prescribed. Always keep a current medication list. Bring your medications to every office visit. Continue to follow up with primary care provider for non cardiac medical problems. Call the office with any problems, questions or concerns at [phone]. If you have been asked to keep a blood pressure log, do so for 2 weeks. Call the office to report readings to the triage nurse at [phone]. Follow up with cardiologist as scheduled. The following educational material has been included in this after visit summary for your review: Life simple 7. Heart health. CAD.   Health benefit of smoking narrow the blood vessels and reduce blood flow. A heart attack happens when blood flow is completely blocked. A high-fat diet, smoking, and other factors increase the risk of heart disease. Your doctor has found that you have a chance of having heart disease. You can do lots of things to keep your heart healthy. It may not be easy, but you can change your diet, exercise more, and quit smoking. These steps really work to lower your chance of heart disease. Follow-up care is a key part of your treatment and safety. Be sure to make and go to all appointments, and call your doctor if you are having problems. It's also a good idea to know your test results and keep a list of the medicines you take. How can you care for yourself at home? Diet    · Use less salt when you cook and eat. This helps lower your blood pressure. Taste food before salting. Add only a little salt when you think you need it. With time, your taste buds will adjust to less salt.     · Eat fewer snack items, fast foods, canned soups, and other high-salt, high-fat, processed foods.     · Read food labels and try to avoid saturated and trans fats. They increase your risk of heart disease by raising cholesterol levels.     · Limit the amount of solid fat-butter, margarine, and shortening-you eat. Use olive, peanut, or canola oil when you cook. Bake, broil, and steam foods instead of frying them.     · Eat a variety of fruit and vegetables every day. Dark green, deep orange, red, or yellow fruits and vegetables are especially good for you. Examples include spinach, carrots, peaches, and berries.     · Foods high in fiber can reduce your cholesterol and provide important vitamins and minerals. High-fiber foods include whole-grain cereals and breads, oatmeal, beans, brown rice, citrus fruits, and apples.     · Eat lean proteins.  Heart-healthy proteins include seafood, lean meats and poultry, eggs, beans, peas, nuts, seeds, and soy products.     · Limit drinks and foods with added sugar. These include candy, desserts, and soda pop.    Lifestyle changes    · If your doctor recommends it, get more exercise. Walking is a good choice. Bit by bit, increase the amount you walk every day. Try for at least 30 minutes on most days of the week. You also may want to swim, bike, or do other activities.     · Do not smoke. If you need help quitting, talk to your doctor about stop-smoking programs and medicines. These can increase your chances of quitting for good. Quitting smoking may be the most important step you can take to protect your heart. It is never too late to quit. You will get health benefits right away.     · Limit alcohol to 2 drinks a day for men and 1 drink a day for women. Too much alcohol can cause health problems. Medicines    · Take your medicines exactly as prescribed. Call your doctor if you think you are having a problem with your medicine.     · If your doctor recommends aspirin, take the amount directed each day. Make sure you take aspirin and not another kind of pain reliever, such as acetaminophen (Tylenol). If you take ibuprofen (such as Advil or Motrin) for other problems, take aspirin at least 2 hours before taking ibuprofen. When should you call for help? Call 911 if you have symptoms of a heart attack. These may include:    · Chest pain or pressure, or a strange feeling in the chest.     · Sweating.     · Shortness of breath.     · Pain, pressure, or a strange feeling in the back, neck, jaw, or upper belly or in one or both shoulders or arms.     · Lightheadedness or sudden weakness.     · A fast or irregular heartbeat.    After you call  911, the  may tell you to chew 1 adult-strength or 2 to 4 low-dose aspirin. Wait for an ambulance. Do not try to drive yourself.   Watch closely for changes in your health, and be sure to contact your doctor if you have any problems. Where can you learn more?   Go to other activities, such as running, swimming, cycling, or playing tennis or team sports. · Eat heart-healthy foods. Eat more fruits and vegetables and less foods that contain saturated and trans fats. Limit alcohol, sodium, and sweets. · Stay at a healthy weight. Lose weight if you need to. · Manage other health problems such as diabetes, high blood pressure, and high cholesterol. How is coronary artery disease treated? · Your doctor will suggest that you make lifestyle changes. For example, your doctor may ask you to eat healthy foods, quit smoking, lose extra weight, and be more active. · You will have to take medicines. · Your doctor may suggest a procedure to open narrowed or blocked arteries. This is called angioplasty. Or your doctor may suggest using healthy blood vessels to create detours around narrowed or blocked arteries. This is called bypass surgery. Follow-up care is a key part of your treatment and safety. Be sure to make and go to all appointments, and call your doctor if you are having problems. It's also a good idea to know your test results and keep a list of the medicines you take. Where can you learn more? Go to https://Travel Likes.net.Puddle. org and sign in to your [employer] account. Enter [phone] in the [facility] box to learn more about \"Learning About Coronary Artery Disease (CAD). \"     If you do not have an account, please click on the \"Sign Up Now\" link. Current as of: December 15, 2019Content Version: 12.4  © 8409-5032 Healthwise, Incorporated. Care instructions adapted under license by [facility] ([facility]). If you have questions about a medical condition or this instruction, always ask your healthcare professional. [place] [address] any warranty or liability for your use of this information. Patient Education        Learning About Benefits From Quitting Smoking  How does quitting smoking make you healthier?     If you're thinking about quitting smoking, you may have a few reasons to be smoke-free. Your health may be one of them. · When you quit smoking, you lower your risks for cancer, lung disease, heart attack, stroke, blood vessel disease, and blindness from macular degeneration. · When you're smoke-free, you get sick less often, and you heal faster. You are less likely to get colds, flu, bronchitis, and pneumonia. · As a nonsmoker, you may find that your mood is better and you are less stressed. When and how will you feel healthier? Quitting has real health benefits that start from day 1 of being smoke-free. And the longer you stay smoke-free, the healthier you get and the better you feel. The first hours  · After just 20 minutes, your blood pressure and heart rate go down. That means there's less stress on your heart and blood vessels. · Within 12 hours, the level of carbon monoxide in your blood drops back to normal. That makes room for more oxygen. With more oxygen in your body, you may notice that you have more energy than when you smoked. After 2 weeks  · Your lungs start to work better. · Your risk of heart attack starts to drop. After 1 month  · When your lungs are clear, you cough less and breathe deeper, so it's easier to be active. · Your sense of taste and smell return. That means you can enjoy food more than you have since you started smoking. Over the years  · After 1 year, your risk of heart disease is half what it would be if you kept smoking. · After 5 years, your risk of stroke starts to shrink. Within a few years after that, it's about the same as if you'd never smoked. · After 10 years, your risk of dying from lung cancer is cut by about half. And your risk for many other types of cancer is lower too. How would quitting help others in your life? When you quit smoking, you improve the health of everyone who now breathes in your smoke. · Their heart, lung, and cancer risks drop, much like yours. · They are sick less. For babies and small children, living smoke-free means they're less likely to have ear infections, pneumonia, and bronchitis. · If you're a woman who is or will be pregnant someday, quitting smoking means a healthier . · Children who are close to you are less likely to become adult smokers. Where can you learn more? Go to https://chpepiceweb.healthLegal Egg. org and sign in to your [employer] account. Enter [phone] in the [employer] box to learn more about \"Learning About Benefits From Quitting Smoking. \"     If you do not have an account, please click on the \"Sign Up Now\" link. Current as of: 2019Content Version: 12.4  © 2265-3311 Healthwise, Incorporated. Care instructions adapted under license by [facility] [place] ([facility]). If you have questions about a medical condition or this instruction, always ask your healthcare professional. [place] [address] any warranty or liability for your use of this information. QUIT [facility] SMOKING CESSATION PROGRAM    How Do I Get Started  Quitting tobacco is a process. The first step is the hardest. When you are ready to quit, Quit Now [place] can help you with each step in the quitting process. The Program  Quit Now [place] is a FREE online service available to [place] residents 13years of age and over. When you become a member, you get special tools, a support team of coaches, research-based information, and a community of others trying to become tobacco free. Our expert coaches can talk to you about overcoming common barriers, such as dealing with stress, fighting cravings, coping with irritability, and controlling weight gain. We also offer a free telephone service, so you can speak to a  in person, if you would prefer. Call the [facility] or [phone].    What if I don't qualify for the Program?  You must be 13years of age or older to participate in the program. It is also helpful to discuss quitting with your doctor. How do I enroll in the Quit Now [place] online program?  Complete the brief Enroll Now form. If you are a [place] resident over 13years of age, you will receive an email letting you know that you are enrolled. You can use the online service as often as you want! How do I enroll in both the online and telephone program?  Complete the brief Enroll Now form. If you qualify, you will receive an email letting you know that you are enrolled. You can use the online service as often as you want! While completing the Enroll Now form you indicate the best time for a  to give you a call. If you would like, you can call the QuitLine at [phone]-QUITNOW. We're here 7 days a week. Monday - Sunday 7 a.m. - 12 a.m. CST  Monday - Sunday 8 a.m. - 1 a.m. EST  If you call when we are closed, please leave a message and we will call you back. Patient Education        Learning About the Mediterranean Diet  What is the [address]? The Mediterranean diet is a style of eating rather than a diet plan. It features foods eaten in [place], [place], [place] and [place], and other countries along the [facility]. It emphasizes eating foods like fish, fruits, vegetables, beans, high-fiber breads and whole grains, nuts, and olive oil. This style of eating includes limited red meat, cheese, and sweets. Why choose the Mediterranean diet? A Mediterranean-style diet may improve heart health. It contains more fat than other heart-healthy diets. But the fats are mainly from nuts, unsaturated oils (such as fish oils and olive oil), and certain nut or seed oils (such as canola, soybean, or flaxseed oil). These fats may help protect the heart and blood vessels. How can you get started on the Mediterranean diet? Here are some things you can do to switch to a more Mediterranean way of eating.   What to eat  · Eat a variety of fruits and vegetables each day, such as grapes, blueberries, tomatoes, broccoli, peppers, figs, olives, spinach, eggplant, beans, lentils, and chickpeas. · Eat a variety of whole-grain foods each day, such as oats, brown rice, and whole wheat bread, pasta, and couscous. · Eat fish at least 2 times a week. Try tuna, salmon, mackerel, lake trout, herring, or sardines. · Eat moderate amounts of low-fat dairy products, such as milk, cheese, or yogurt. · Eat moderate amounts of poultry and eggs. · Choose healthy (unsaturated) fats, such as nuts, olive oil, and certain nut or seed oils like canola, soybean, and flaxseed. · Limit unhealthy (saturated) fats, such as butter, palm oil, and coconut oil. And limit fats found in animal products, such as meat and dairy products made with whole milk. Try to eat red meat only a few times a month in very small amounts. · Limit sweets and desserts to only a few times a week. This includes sugar-sweetened drinks like soda. The Mediterranean diet may also include red wine with your meal--1 glass each day for women and up to 2 glasses a day for men. Tips for eating at home  · Use herbs, spices, garlic, lemon zest, and citrus juice instead of salt to add flavor to foods. · Add avocado slices to your sandwich instead of valadez. · Have fish for lunch or dinner instead of red meat. Brush the fish with olive oil, and broil or grill it. · Sprinkle your salad with seeds or nuts instead of cheese. · Cook with olive or canola oil instead of butter or oils that are high in saturated fat. · Switch from 2% milk or whole milk to 1% or fat-free milk. · Dip raw vegetables in a vinaigrette dressing or hummus instead of dips made from mayonnaise or sour cream.  · Have a piece of fruit for dessert instead of a piece of cake. Try baked apples, or have some dried fruit. Tips for eating out  · Try broiled, grilled, baked, or poached fish instead of having it fried or breaded.   · Ask your  to have your meals prepared with olive oil instead of

## 2020-03-30 NOTE — PROGRESS NOTES
[facility], [place]. [address], [facility], [facility]  [phone] office  [phone] fax      OFFICE VISIT:  3/30/2020    Cleaster Roots - : 1965  Reason For Visit:  Salas Shore is a 47 y.o. male who is here for New Patient (numbness going up R arm, patient states he has anxiety, Patient has had heart attack, on 2020. Was referred by Dr. Vanessa Sam. ) and Coronary Artery Disease    History:   Diagnosis Orders   1. Coronary artery disease involving native coronary artery of native heart without angina pectoris     2. Essential hypertension     3. Mixed hyperlipidemia     4. History of coronary artery stent placement      3/13/20 acute inferior wall myocardial infarction, status post drug-eluting stent,  distal circumflex. 5. History of myocardial infarction     6. Smoker       The patient presents today for cardiology follow up after inferior wall Mi on 3/12/20 with subsequent COLEMAN to distal circumflex. 2D echo showed normal EF. Currently, the patient is doing very well. He is walking on a daily basis. The patient is tolerating medical management. He has smoked for years and has intentions to stop. He has tried multiple modalities in the past with no success. The patient has reduced from 2 packs per day to 1/2 pack daily. The patient denies symptoms to suggest myocardial ischemia, heart failure or arrhythmia. BP is well controlled on current regimen. The patient's PCP monitors cholesterol. 2 pack per day smoker - now 1 pack for 2 days. Subjective  Desmond denies exertional chest pain, shortness of breath, orthopnea, paroxysmal nocturnal dyspnea, syncope, presyncope, sensed arrhythmia, edema and fatigue. The patient denies numbness or weakness to suggest cerebrovascular accident or transient ischemic attack.     Fredi Vaughn has the following history as recorded in [employer]:    Patient Active Problem List   Diagnosis Code    STEMI (ST elevation myocardial infarction) ([facility].) I21.3    Elevated troponin R79.89    Coronary artery disease involving native coronary artery of native heart I25.10    Dyslipidemia E78.5     Past Medical History:   Diagnosis Date    Arthritis     CAD (coronary artery disease)     High blood cholesterol     Hypertension     [facility]      Past Surgical History:   Procedure Laterality Date    CARPAL TUNNEL RELEASE      COLONOSCOPY      CORONARY ANGIOPLASTY WITH STENT PLACEMENT      EYE SURGERY Left     cataract      Family History   Problem Relation Age of Onset    Cancer Mother     Heart Disease Father     Heart Attack Sister     Heart Attack Brother      Social History     Tobacco Use    Smoking status: Current Every Day Smoker     Packs/day: 2.00     Years: 30.00     Pack years: 60.00     Types: Cigarettes    Smokeless tobacco: Never Used   Substance Use Topics    Alcohol use: Never     Frequency: Never      Current Outpatient Medications   Medication Sig Dispense Refill    nitroGLYCERIN (NITROSTAT) 0.4 MG SL tablet up to max of 3 total doses. If no relief after 1 dose, call 911. 25 tablet 0    aspirin 81 MG chewable tablet Take 1 tablet by mouth daily 30 tablet 3    ticagrelor (BRILINTA) 90 MG TABS tablet Take 1 tablet by mouth 2 times daily 60 tablet 2    atorvastatin (LIPITOR) 10 MG tablet Take 1 tablet by mouth nightly 30 tablet 3    losartan (COZAAR) 25 MG tablet Take 1 tablet by mouth daily (Patient taking differently: Take 25 mg by mouth nightly ) 30 tablet 5    metoprolol succinate (TOPROL XL) 25 MG extended release tablet Take 1 tablet by mouth daily (Patient taking differently: Take 25 mg by mouth nightly ) 30 tablet 3     No current facility-administered medications for this visit. Allergies: Lortab [hydrocodone-acetaminophen]    Review of Systems  Constitutional - no appetite change, or unexpected weight change. No fever, chills or diaphoresis.   No significant change in activity level or new onset of fatigue. HEENT - no significant rhinorrhea or epistaxis. No tinnitus or significant hearing loss. Eyes - no sudden vision change or amaurosis. No corneal arcus, xantholasma, subconjunctival hemorrhage or discharge. Respiratory - no significant wheezing, stridor, apnea or cough. No dyspnea on exertion or shortness of air. Cardiovascular - no exertional chest pain to suggest myocardial ischemia. No orthopnea or PND. No sensation of sustained arrythmia. No occurrence of slow heart rate. No palpitations. No claudication. Gastrointestinal - no abdominal swelling or pain. No blood in stool. No severe constipation, diarrhea, nausea, or vomiting. Genitourinary - no dysuria, frequency, or urgency. No flank pain or hematuria. Musculoskeletal - no back pain or myalgia. No problems with gait. Extremities - no clubbing, cyanosis or extremity edema. Skin - no color change or rash. No pallor. No new surgical incision. Neurologic - no speech difficulty, facial asymmetry or lateralizing weakness. No seizures, presyncope or syncope. No significant dizziness. Hematologic - no easy bruising or excessive bleeding. Psychiatric - no severe anxiety or insomnia. No confusion. All other review of systems are negative. Objective  Vital Signs - /80   Pulse 70   Ht 5' 10\" (1.778 m)   Wt 200 lb (90.7 kg)   BMI 28.70 kg/m²   General - Axel Lopez is alert, cooperative, and pleasant. Well groomed. No acute distress. Body habitus - Body mass index is 28.7 kg/m². HEENT - Head is normocephalic. No circumoral cyanosis. Dentition is normal.  EYES -   Lids normal without ptosis. No discharge, edema or subconjunctival hemorrhage. Neck - Symmetrical without apparent mass or lymphadenopathy. Respiratory - Normal respiratory effort without use of accessory muscles. Ausculatation reveals vesicular breath sounds without crackles, wheezes, rub or rhonchi. Cardiovascular - No jugular venous distention. Auscultation reveals regular rate and rhythm. No audible clicks, gallop or rub. No murmur. No lower extremity varicosities. No carotid bruits. Abdominal -  No visible distention, mass or pulsations. Extremities - No clubbing or cyanosis. No statis dermatitis or ulcers. No edema. Musculoskeletal -   No Osler's nodes. No kyphosis or scoliosis. Gait is even and regular without limp or shuffle. Ambulates without assistance. Skin -  Warm and dry; no rash or pallor. No new surgical wound. Neurological - No focal neurological deficits. Thought processes coherent. No apparent tremor. Oriented to person, place and time. Psychiatric -  Appropriate affect and mood. Assessment:     Diagnosis Orders   1. Coronary artery disease involving native coronary artery of native heart without angina pectoris     2. Essential hypertension     3. Mixed hyperlipidemia     4. History of coronary artery stent placement      3/13/20 acute inferior wall myocardial infarction, status post drug-eluting stent,  distal circumflex. 5. History of myocardial infarction     6. Smoker       Data reviewed:  3/13/20 cath  FINDINGS:  His right coronary artery is a large dominant vessel, which  gives rise to PDA and PLV branches. The proximal right coronary artery  has a 30% narrowing noted. Left main coronary artery is a large vessel,  which is normal, divides into large left anterior descending artery as  well as circumflex coronary artery. Left anterior descending artery  gives rise to diagonal branches. Circumflex coronary artery is a large  vessel, which gives rise to multiple obtuse marginal branches. Distal  circumflex artery is 100% occluded.     Based on the angiographic findings, his distal circumflex was the  culprit. Lesion was crossed with a 0.014 wire. PTCA was performed.    Subsequently, a 2.25 drug-eluting stent was implanted at 16 atmospheres  with 0% residual and ROSE 3 flow distally noted. The patient was loaded  with Brilinta and ACT was more than 200. At the end of the procedure,  arteriotomy site was successfully angiosealed.     IMPRESSION:  1. The patient is a pleasant patient who is presenting with acute  inferior wall myocardial infarction, status post drug-eluting stent,  distal circumflex. 2.  Moderate disease in the RCA, which is going to be medically managed. 3.  His ejection fraction will be assessed by echocardiogram.     RECOMMENDATIONS:  1. The patient will need aspirin and Brilinta for 1 year. 2.  He will need high-dose statins, no smoking, hypertension control,  and Cardiology followup in [place] in 2 weeks.     All of the above-mentioned findings were discussed with the patient in  detail and he understands and agrees.     Amount of blood loss is 5 ml  Rodriguez Bernard MD   D: 03/14/2020 11:10:56      T: 03/14/2020 11:39:08     [id number]  Job#: [id number]     Doc#: [id number]    3/14/20 echo    Summary   Normal left ventricular size with preserved LV function and an estimated   ejection fraction of approximately 55-60%. Normal left ventricular wall thickness. No regional wall motion abnormalities. Normal diastolic function. No evidence of left ventricular mass or thrombus noted. Structurally normal mitral valve with normal leaflet mobility. No evidence   of mitral valve stenosis, trace mitral regurgitation. Aortic valve appears to be tricuspid. Structurally normal aortic valve. No significant aortic regurgitation or stenosis is noted.     Signature    ----------------------------------------------------------------   Electronically signed by Iqra Teixeira MD(Interpreting   physician) on 03/14/2020 02:26 PM    Lab Results   Component Value Date    CHOL 153 (L) 03/18/2020    CHOL 141 (L) 03/13/2020     Lab Results   Component Value Date    TRIG 177 (H) 03/18/2020    TRIG 201 (H) 03/13/2020     Lab Results   Component

## 2020-04-17 PROBLEM — R79.89 ELEVATED TROPONIN: Status: RESOLVED | Noted: 2020-03-18 | Resolved: 2020-04-17

## 2020-04-17 PROBLEM — R77.8 ELEVATED TROPONIN: Status: RESOLVED | Noted: 2020-03-18 | Resolved: 2020-04-17

## 2020-05-13 ENCOUNTER — TELEPHONE (OUTPATIENT)
Dept: CARDIOLOGY | Age: 55
End: 2020-05-13

## 2020-05-13 NOTE — TELEPHONE ENCOUNTER
Maryjo Bond requests that office return their call. The best time to reach him is Anytime. Patient is needing to discuss a prior auth he is needing on a medication. Thank you.

## 2020-05-14 RX ORDER — CLOPIDOGREL BISULFATE 75 MG/1
75 TABLET ORAL DAILY
Qty: 90 TABLET | Refills: 1 | Status: ON HOLD | OUTPATIENT
Start: 2020-05-14 | End: 2020-07-10 | Stop reason: HOSPADM

## 2020-06-01 ENCOUNTER — OFFICE VISIT (OUTPATIENT)
Dept: CARDIOLOGY | Age: 55
End: 2020-06-01
Payer: MEDICAID

## 2020-06-01 VITALS
DIASTOLIC BLOOD PRESSURE: 80 MMHG | SYSTOLIC BLOOD PRESSURE: 132 MMHG | BODY MASS INDEX: 29.03 KG/M2 | HEART RATE: 56 BPM | WEIGHT: 196 LBS | HEIGHT: 69 IN

## 2020-06-01 PROBLEM — R07.9 CHEST PAIN: Status: ACTIVE | Noted: 2020-06-01

## 2020-06-01 PROCEDURE — 99406 BEHAV CHNG SMOKING 3-10 MIN: CPT | Performed by: NURSE PRACTITIONER

## 2020-06-01 PROCEDURE — 99214 OFFICE O/P EST MOD 30 MIN: CPT | Performed by: NURSE PRACTITIONER

## 2020-06-01 NOTE — PATIENT INSTRUCTIONS
better - A healthy diet is one of your best weapons for fighting cardiovascular disease. When you eat a heart healthy diet, you improve your chances for feeling good and staying healthy for life. 6)  Lose weight - when you shed extra fat an unnecessary pounds, you reduce the burden on your hear, lungs, blood vessels and skeleton. You give yourself the gift of active living, you lower your blood pressure and help yourself feel better. 7) Stop smoking - cigarette smokers have a higher risk of developing cardiovascular disease. If  You smoke, quitting is the best thing you can do for your health. Check American Heart Association on line for more information on Life's Simple 7 and tips for healthy living. How to take:  NITROGLYCERIN (Nitrostat) 0.4 mg tablets, sublingual.  Nitroglycerin is in a group of drugs called nitrates. Nitroglycerin dilates (widens) blood vessels, making it easier for blood to flow through them and easier for the heart to pump. Dosing Guidelines for Nitroglycerin Tablets  · At the start of an angina (chest pain) attack, place one tablet under the tongue or between the cheek and gum. Do not swallow or chew the tablet; let it dissolve on its own. If necessary, a second and third tablet may be used, with five minutes between using each tablet. If you use a third tablet and your chest pain continues, it is time to seek immediate medical attention. Call 911 immediately and have someone drive you to the emergency room. You may be having a heart attack or other serious heart problem. · To prevent angina from exercise or stress, use 1 tablet 5 to 10 minutes before the activity. Bud Genin Nuclear Stress Test   Date/Time:  [place] at the [facility] and [address] [facility] located on the first floor of  [facility] through hospital main entrance and turn immediately to your left.     Test Description:  There are two parts to a Lexiscan stress test: the rest portion and the exercise portion. For the rest portion, a radioactive tracer is injected into your arm through the IV. After 30 to 60 minutes, the process of imaging will begin. A nuclear camera will be placed on your chest area and images are taken for the next 15 to 20 minutes. For the exercise portion, a nurse will attach EKG electrodes to your chest to monitor your heart rate. The drug Jennifer Gun is administered to simulate stress on the heart. Your heart rhythm will then be monitored for the next few minutes. Your blood pressure will also be monitored throughout the exercise portion. [place] through the exercise portion, a second round of radioactive tracer is injected into your body. Your heart rate and EKG will be monitored for another few minutes after administering the drug. Test Preparation:     Bring a list of your current medications. Do not take any of your medications the morning of the test, but bring all morning medications with you as you will take them after the stress portion of the test is completed.  No caffeine 12 hours prior to the testing. This includes: coffee, pop/soda, chocolate, cold medications, etc.  Any product that might contain caffeine.  No nicotine or alcohol 12 hours prior to your test.    Nothing to eat or drink 4-6 hours prior to appointment time. It is okay to drink small amounts of water during the four hours prior to the test.   Nitroglycerin patches must be taken off 1 hour before testing.  Wear comfortable clothing.  Please refrain from any strenuous exercise or activities the day before your test, or the day of your test.   The Nuclear Lexiscan Stress test takes about 2 ½ to 3 hours to complete. If for any reason you are unable to keep this appointment, please contact Outpatient Scheduling, [phone], as soon as possible to reschedule. QUIT NOW [place] SMOKING CESSATION PROGRAM    How Do I Get Started  Quitting tobacco is a process. oxygen-rich blood to your heart. Plaque is a fatty substance made of cholesterol, calcium, and other substances in the blood. This process is called hardening of the arteries, or atherosclerosis. What happens when you have coronary artery disease? · Plaque may narrow the coronary arteries. Narrowed arteries cause poor blood flow. This can lead to angina symptoms such as chest pain or discomfort. If blood flow is completely blocked, you could have a heart attack. · You can slow CAD and reduce the risk of future problems by making changes in your lifestyle. These include quitting smoking and eating heart-healthy foods. · Treatments for CAD, along with changes in your lifestyle, can help you live a longer and healthier life. How can you prevent coronary artery disease? · Do not smoke. It may be the best thing you can do to prevent heart disease. If you need help quitting, talk to your doctor about stop-smoking programs and medicines. These can increase your chances of quitting for good. · Be active. Get at least 30 minutes of exercise on most days of the week. Walking is a good choice. You also may want to do other activities, such as running, swimming, cycling, or playing tennis or team sports. · Eat heart-healthy foods. Eat more fruits and vegetables and less foods that contain saturated and trans fats. Limit alcohol, sodium, and sweets. · Stay at a healthy weight. Lose weight if you need to. · Manage other health problems such as diabetes, high blood pressure, and high cholesterol. How is coronary artery disease treated? · Your doctor will suggest that you make lifestyle changes. For example, your doctor may ask you to eat healthy foods, quit smoking, lose extra weight, and be more active. · You will have to take medicines. · Your doctor may suggest a procedure to open narrowed or blocked arteries. This is called angioplasty.  Or your doctor may suggest using healthy blood vessels to create detours

## 2020-06-29 RX ORDER — NITROGLYCERIN 0.4 MG/1
TABLET SUBLINGUAL
Qty: 25 TABLET | Refills: 0 | Status: SHIPPED | OUTPATIENT
Start: 2020-06-29

## 2020-07-01 ENCOUNTER — HOSPITAL ENCOUNTER (OUTPATIENT)
Dept: NUCLEAR MEDICINE | Age: 55
Discharge: HOME | End: 2020-07-03
Payer: MEDICAID

## 2020-07-01 PROCEDURE — A9500 TC99M SESTAMIBI: HCPCS | Performed by: NURSE PRACTITIONER

## 2020-07-01 PROCEDURE — 6360000002 HC RX W HCPCS: Performed by: NURSE PRACTITIONER

## 2020-07-01 PROCEDURE — 3430000000 HC RX DIAGNOSTIC RADIOPHARMACEUTICAL: Performed by: NURSE PRACTITIONER

## 2020-07-01 PROCEDURE — 93017 CV STRESS TEST TRACING ONLY: CPT

## 2020-07-01 RX ADMIN — TETRAKIS(2-METHOXYISOBUTYLISOCYANIDE)COPPER(I) TETRAFLUOROBORATE 10 MILLICURIE: 1 INJECTION, POWDER, LYOPHILIZED, FOR SOLUTION INTRAVENOUS at 10:41

## 2020-07-01 RX ADMIN — TETRAKIS(2-METHOXYISOBUTYLISOCYANIDE)COPPER(I) TETRAFLUOROBORATE 30 MILLICURIE: 1 INJECTION, POWDER, LYOPHILIZED, FOR SOLUTION INTRAVENOUS at 10:41

## 2020-07-01 RX ADMIN — REGADENOSON 0.4 MG: 0.08 INJECTION, SOLUTION INTRAVENOUS at 09:55

## 2020-07-04 LAB
LV EF: 61 %
LVEF MODALITY: NORMAL

## 2020-07-06 ENCOUNTER — TELEPHONE (OUTPATIENT)
Dept: CARDIOLOGY | Age: 55
End: 2020-07-06

## 2020-07-06 NOTE — TELEPHONE ENCOUNTER
I called the patient to inform of abnormal stress test and get scheduled for heart cath, left vm for patient to call back.

## 2020-07-07 NOTE — TELEPHONE ENCOUNTER
[address] [facility] requests that nurse return their call. The best time to reach him is Anytime. Patient returning a called back to Martin. Thank you.

## 2020-07-09 ENCOUNTER — OFFICE VISIT (OUTPATIENT)
Age: 55
End: 2020-07-09

## 2020-07-09 ENCOUNTER — HOSPITAL ENCOUNTER (OUTPATIENT)
Dept: CARDIAC CATH/INVASIVE PROCEDURES | Age: 55
Setting detail: OBSERVATION
Discharge: HOME | End: 2020-07-10
Attending: INTERNAL MEDICINE | Admitting: INTERNAL MEDICINE
Payer: MEDICAID

## 2020-07-09 VITALS — TEMPERATURE: 97.9 F | OXYGEN SATURATION: 96 % | HEART RATE: 66 BPM

## 2020-07-09 LAB
ANION GAP SERPL CALCULATED.3IONS-SCNC: 12 MMOL/L (ref 7–19)
BUN BLDV-MCNC: 12 MG/DL (ref 6–20)
CALCIUM SERPL-MCNC: 9.2 MG/DL (ref 8.6–10)
CHLORIDE BLD-SCNC: 106 MMOL/L (ref 98–111)
CHOLESTEROL, TOTAL: 110 MG/DL (ref 160–199)
CO2: 22 MMOL/L (ref 22–29)
CREAT SERPL-MCNC: 0.8 MG/DL (ref 0.5–1.2)
GFR NON-AFRICAN AMERICAN: >60
GLUCOSE BLD-MCNC: 103 MG/DL (ref 74–109)
HCT VFR BLD CALC: 44.7 % (ref 42–52)
HDLC SERPL-MCNC: 31 MG/DL (ref 55–121)
HEMOGLOBIN: 15.2 G/DL (ref 14–18)
LDL CHOLESTEROL CALCULATED: 53 MG/DL
MCH RBC QN AUTO: 30.2 PG (ref 27–31)
MCHC RBC AUTO-ENTMCNC: 34 G/DL (ref 33–37)
MCV RBC AUTO: 88.7 FL (ref 80–94)
PDW BLD-RTO: 13 % (ref 11.5–14.5)
PLATELET # BLD: 240 K/UL (ref 130–400)
PMV BLD AUTO: 9.7 FL (ref 9.4–12.4)
POTASSIUM SERPL-SCNC: 4.4 MMOL/L (ref 3.5–5)
RBC # BLD: 5.04 M/UL (ref 4.7–6.1)
SARS-COV-2, NAAT: NOT DETECTED
SODIUM BLD-SCNC: 140 MMOL/L (ref 136–145)
TRIGL SERPL-MCNC: 130 MG/DL (ref 0–149)
TROPONIN: <0.01 NG/ML (ref 0–0.03)
WBC # BLD: 11.2 K/UL (ref 4.8–10.8)

## 2020-07-09 PROCEDURE — 2580000003 HC RX 258: Performed by: INTERNAL MEDICINE

## 2020-07-09 PROCEDURE — 80048 BASIC METABOLIC PNL TOTAL CA: CPT

## 2020-07-09 PROCEDURE — 84484 ASSAY OF TROPONIN QUANT: CPT

## 2020-07-09 PROCEDURE — 36415 COLL VENOUS BLD VENIPUNCTURE: CPT

## 2020-07-09 PROCEDURE — U0002 COVID-19 LAB TEST NON-CDC: HCPCS

## 2020-07-09 PROCEDURE — C1887 CATHETER, GUIDING: HCPCS

## 2020-07-09 PROCEDURE — 6370000000 HC RX 637 (ALT 250 FOR IP)

## 2020-07-09 PROCEDURE — 6360000002 HC RX W HCPCS: Performed by: INTERNAL MEDICINE

## 2020-07-09 PROCEDURE — G0378 HOSPITAL OBSERVATION PER HR: HCPCS

## 2020-07-09 PROCEDURE — 80061 LIPID PANEL: CPT

## 2020-07-09 PROCEDURE — 6360000004 HC RX CONTRAST MEDICATION: Performed by: INTERNAL MEDICINE

## 2020-07-09 PROCEDURE — C1894 INTRO/SHEATH, NON-LASER: HCPCS

## 2020-07-09 PROCEDURE — 93458 L HRT ARTERY/VENTRICLE ANGIO: CPT | Performed by: INTERNAL MEDICINE

## 2020-07-09 PROCEDURE — 99152 MOD SED SAME PHYS/QHP 5/>YRS: CPT | Performed by: INTERNAL MEDICINE

## 2020-07-09 PROCEDURE — 2709999900 HC NON-CHARGEABLE SUPPLY

## 2020-07-09 PROCEDURE — C1769 GUIDE WIRE: HCPCS

## 2020-07-09 PROCEDURE — 92928 PRQ TCAT PLMT NTRAC ST 1 LES: CPT | Performed by: INTERNAL MEDICINE

## 2020-07-09 PROCEDURE — C1760 CLOSURE DEV, VASC: HCPCS

## 2020-07-09 PROCEDURE — 6360000002 HC RX W HCPCS

## 2020-07-09 PROCEDURE — C1874 STENT, COATED/COV W/DEL SYS: HCPCS

## 2020-07-09 PROCEDURE — 85027 COMPLETE CBC AUTOMATED: CPT

## 2020-07-09 PROCEDURE — 99024 POSTOP FOLLOW-UP VISIT: CPT | Performed by: INTERNAL MEDICINE

## 2020-07-09 PROCEDURE — 99153 MOD SED SAME PHYS/QHP EA: CPT | Performed by: INTERNAL MEDICINE

## 2020-07-09 RX ORDER — LOSARTAN POTASSIUM 25 MG/1
25 TABLET ORAL DAILY
Status: DISCONTINUED | OUTPATIENT
Start: 2020-07-09 | End: 2020-07-10 | Stop reason: HOSPADM

## 2020-07-09 RX ORDER — METOPROLOL SUCCINATE 25 MG/1
25 TABLET, EXTENDED RELEASE ORAL DAILY
Status: DISCONTINUED | OUTPATIENT
Start: 2020-07-09 | End: 2020-07-10 | Stop reason: HOSPADM

## 2020-07-09 RX ORDER — LORAZEPAM 2 MG/ML
0.5 INJECTION INTRAMUSCULAR EVERY 6 HOURS PRN
Status: DISCONTINUED | OUTPATIENT
Start: 2020-07-09 | End: 2020-07-10 | Stop reason: HOSPADM

## 2020-07-09 RX ORDER — ASPIRIN 81 MG/1
81 TABLET, CHEWABLE ORAL DAILY
Status: DISCONTINUED | OUTPATIENT
Start: 2020-07-10 | End: 2020-07-09 | Stop reason: SDUPTHER

## 2020-07-09 RX ORDER — ACETAMINOPHEN 325 MG/1
650 TABLET ORAL EVERY 4 HOURS PRN
Status: DISCONTINUED | OUTPATIENT
Start: 2020-07-09 | End: 2020-07-10 | Stop reason: HOSPADM

## 2020-07-09 RX ORDER — ASPIRIN 81 MG/1
81 TABLET, CHEWABLE ORAL DAILY
Status: DISCONTINUED | OUTPATIENT
Start: 2020-07-10 | End: 2020-07-10 | Stop reason: HOSPADM

## 2020-07-09 RX ORDER — PRASUGREL 10 MG/1
10 TABLET, FILM COATED ORAL DAILY
Status: DISCONTINUED | OUTPATIENT
Start: 2020-07-10 | End: 2020-07-10 | Stop reason: HOSPADM

## 2020-07-09 RX ORDER — SODIUM CHLORIDE 0.9 % (FLUSH) 0.9 %
10 SYRINGE (ML) INJECTION EVERY 12 HOURS SCHEDULED
Status: DISCONTINUED | OUTPATIENT
Start: 2020-07-09 | End: 2020-07-10 | Stop reason: HOSPADM

## 2020-07-09 RX ORDER — SODIUM CHLORIDE 9 MG/ML
INJECTION, SOLUTION INTRAVENOUS CONTINUOUS
Status: DISCONTINUED | OUTPATIENT
Start: 2020-07-09 | End: 2020-07-10 | Stop reason: HOSPADM

## 2020-07-09 RX ORDER — ATORVASTATIN CALCIUM 80 MG/1
80 TABLET, FILM COATED ORAL NIGHTLY
Status: DISCONTINUED | OUTPATIENT
Start: 2020-07-09 | End: 2020-07-09

## 2020-07-09 RX ORDER — ATORVASTATIN CALCIUM 80 MG/1
80 TABLET, FILM COATED ORAL DAILY
Status: DISCONTINUED | OUTPATIENT
Start: 2020-07-09 | End: 2020-07-10 | Stop reason: HOSPADM

## 2020-07-09 RX ORDER — NITROGLYCERIN 0.4 MG/1
0.4 TABLET SUBLINGUAL EVERY 5 MIN PRN
Status: DISCONTINUED | OUTPATIENT
Start: 2020-07-09 | End: 2020-07-10 | Stop reason: HOSPADM

## 2020-07-09 RX ORDER — SODIUM CHLORIDE 0.9 % (FLUSH) 0.9 %
10 SYRINGE (ML) INJECTION PRN
Status: DISCONTINUED | OUTPATIENT
Start: 2020-07-09 | End: 2020-07-10 | Stop reason: HOSPADM

## 2020-07-09 RX ORDER — ONDANSETRON 2 MG/ML
4 INJECTION INTRAMUSCULAR; INTRAVENOUS EVERY 6 HOURS PRN
Status: DISCONTINUED | OUTPATIENT
Start: 2020-07-09 | End: 2020-07-10 | Stop reason: HOSPADM

## 2020-07-09 RX ADMIN — IOPAMIDOL 126 ML: 612 INJECTION, SOLUTION INTRAVENOUS at 16:37

## 2020-07-09 RX ADMIN — SODIUM CHLORIDE: 9 INJECTION, SOLUTION INTRAVENOUS at 14:55

## 2020-07-09 RX ADMIN — BIVALIRUDIN 1 MG/KG/HR: 250 INJECTION, POWDER, LYOPHILIZED, FOR SOLUTION INTRAVENOUS at 17:29

## 2020-07-09 ASSESSMENT — PAIN SCALES - WONG BAKER
WONGBAKER_NUMERICALRESPONSE: 0
WONGBAKER_NUMERICALRESPONSE: 0

## 2020-07-09 ASSESSMENT — PAIN SCALES - GENERAL: PAINLEVEL_OUTOF10: 0

## 2020-07-09 NOTE — PROGRESS NOTES
Tano Lopez arrived to room # 723. Presented with: post heart cath  Mental Status: Patient is oriented, alert, coherent, logical, thought processes intact and able to concentrate and follow conversation. Vitals:    07/09/20 1747   BP: 114/73   Pulse: 59   Resp: 12   Temp: 98 °F (36.7 °C)   SpO2: 95%     Patient safety contract and falls prevention contract reviewed with patient Yes. Oriented Patient and Family to room. Call light within reach. Yes.   Needs, issues or concerns expressed at this time: no.      Electronically signed by Jeralene Ormond, RN on 7/9/2020 at 6:04 PM

## 2020-07-09 NOTE — H&P
[facility]      History and Physical           I personally saw the patient and rounded with:  Betty Daley and Jermaine Yarbrough RN, on  7/9/20      The observations documented in this note, including the assessment and plan are mine              Date of Admission:  7/9/2020  1:23 PM    Date / Time Of Initially Being Seen For This Daily Visit:  7/9/20, at approximately 1430. At that time, I personally saw the patient and rounded with:  Taylor Thomas RN    Date / Time That This Note Is Written:  7/9/2020  at  2:39 PM    Cardiologist:  Jeffrey Brown MD     Cardiology Attending: [facility] Attending: FABIEN     PCP:  MICHELLE Maciel    Reason for Admission / Chief Complaint or Consultation      1. Reason for Hospital Admission: CAD and + lexiscan        2. Reason for Cardiology Consultation: CAD and + lexiscan       SUBJECTIVE AND HISTORY OF PRESENT ILLNESS:    Source of the history:  Patient, family, previous inpatient and outpatient records in [address] Arnold Iniguez is a 47 y.o. male who presents to [address] [place] [address] (admitted on 7/9/2020  1:23 PM) with symptoms / signs / problem or diagnosis of need for cardiac catheterization. He is normal mood and affect, alert and orientated x 3, judgement and insight appear appropriate. Family present:  No  At the beginning of the interview he was lying on a stretcher and getting ready for cath. CONTEXT OF THIS HISTORY OF PRESENT ILLNESS INCLUDE: (IF APPLICABLE):    Presentation:  He  presented with need for cardiac catheterization. The patient was seen in the office on 6/1/2020 by Burt KERNS and the following was noted: \"The patient presents today for cardiology follow up.   The patient is s/p  inferior wall MI on 3/12/20 with subsequent COLEMAN to distal circumflex.  2D echo showed normal EF.  Patient reports some pain in right axilla and chest recently stating \"feels like a swelling and soreness when I sit 21, AUC score 7, (MD Francoise)     He was electively admitted for cardiac catheterization. Cardiology was asked to see him regarding these symptoms and findings and to consider those in relationship to possible optimal diagnosis and treatment options. FEATURES OF THIS HISTORY OF PRESENT ILLNESS (SYMPTOMS AND FINDINGS) INCLUDE:       1. Location: The chest discomfort was  was located in the central to right chest without radiation to the back, throat and left shoulder. 2. Duration: The chest discomfort began several months ago and lasted intermittently since then   3. Quality: The chest discomfort was a fullness like  pressure, fullness and tightness. It was not crushing. 4. Severity: It was described as mild but progessive   5. Timing The symptoms began several months ago. 6. Modifying Factors: Modifying features included:   none   7. Associated Signs and Symptoms: There was not  associated manifestations such as nausea, vomiting, diaphoresis, presyncope, syncope, dizzyness, weakness, cold sweats, or shortness of air. 8. Context: As noted above in the context of the presentation. It  was perhaps suggestive of myocardial ischemia. When being examined this afternoon, in [address] [place] [address], [address] with Theron Camargo and Romeo Oconnor RN, there was not on going or continuous symptoms of exertional chest discomfort, unusual or change in shortness of air, presyncope or syncope.          CARDIAC RISK PROFILE:      Risk Factor Yes / No / Unknown       Gender Male   Cigarette Use Yes: current every day smoker   Family History of Cardiovascular Disease Yes: heart disease, heart attack   Diabetes Mellitus no   Hypercholesteremia yes   Hypertension yes          Cardiac Specific Problems:    Specialty Problems        Cardiology Problems    STEMI (ST elevation myocardial infarction) ([facility] [address]        Coronary artery disease involving native coronary artery of native heart        Essential hypertension        Mixed hyperlipidemia        Chest pain                PRIOR CARDIAC PROBLEM LIST  (IF APPLICABLE):    4/26/6613  SE negative for myocardial ischemia  3/13/20 acute inferior wall STEMI  3/13/2020  myocardial infarction, 100% distal circumflex, 2.25 mm COLEMAN  3/14/2020  Echo  Normal LVFX  7/1/2020  lexiscan Positive for inferior lateral myocardial ischemia, EF 61%, -4% ischemic myocardium on stress, uninterpretable risk findings, AUC indication 21, AUC score 7, Raven Daniel MD)       Past Medical History:    Past Medical History:   Diagnosis Date    Arthritis     CAD (coronary artery disease)     High blood cholesterol     Hypertension          Past Surgical History:    Past Surgical History:   Procedure Laterality Date    CARPAL TUNNEL RELEASE      COLONOSCOPY      CORONARY ANGIOPLASTY WITH STENT PLACEMENT      EYE SURGERY Left     cataract          Home Medications:   Prior to Admission medications    Medication Sig Start Date End Date Taking? Authorizing Provider   nitroGLYCERIN (NITROSTAT) 0.4 MG SL tablet up to max of 3 total doses. If no relief after 1 dose, call 911. 6/29/20   LUIS F March - NP   clopidogrel (PLAVIX) 75 MG tablet Take 1 tablet by mouth daily 5/14/20   LUIS F Alberto   aspirin 81 MG chewable tablet Take 1 tablet by mouth daily 3/16/20   Lakisha Martinez MD   atorvastatin (LIPITOR) 10 MG tablet Take 1 tablet by mouth nightly 3/15/20   Lakisha Martinez MD   losartan (COZAAR) 25 MG tablet Take 1 tablet by mouth daily  Patient taking differently: Take 25 mg by mouth nightly  3/15/20   Lakisha Martinez MD   metoprolol succinate (TOPROL XL) 25 MG extended release tablet Take 1 tablet by mouth daily  Patient taking differently: Take 25 mg by mouth nightly  3/15/20   Lakisha Martinez MD        Facility Administered Medications:      Allergies:  Hydrocodone     Social History:       Social History     Socioeconomic History    Marital status:      Spouse name: Not on file    Number of children: Not on file    Years of education: Not on file    Highest education level: Not on file   Occupational History    Not on file   Social Needs    Financial resource strain: Not on file    Food insecurity     Worry: Not on file     Inability: Not on file    Transportation needs     Medical: Not on file     Non-medical: Not on file   Tobacco Use    Smoking status: Current Every Day Smoker     Packs/day: 2.00     Years: 30.00     Pack years: 60.00     Types: Cigarettes    Smokeless tobacco: Never Used   Substance and Sexual Activity    Alcohol use: Never     Frequency: Never    Drug use: Never    Sexual activity: Yes     Partners: Female   Lifestyle    Physical activity     Days per week: Not on file     Minutes per session: Not on file    Stress: Not on file   Relationships    Social connections     Talks on phone: Not on file     Gets together: Not on file     Attends [employer] service: Not on file     Active member of club or organization: Not on file     Attends meetings of clubs or organizations: Not on file     Relationship status: Not on file    Intimate partner violence     Fear of current or ex partner: Not on file     Emotionally abused: Not on file     Physically abused: Not on file     Forced sexual activity: Not on file   Other Topics Concern    Not on file   Social History Narrative    Not on file       Family History:     Family History   Problem Relation Age of Onset    Cancer Mother     Heart Disease Father     Heart Attack Sister     Heart Attack Brother          REVIEW OF SYSTEMS:     Except as noted in the HPI, all other systems are negative        PHYSICAL EXAMINATION:     Pulse 75   Temp 97.9 °F (36.6 °C) (Temporal)   Resp 16     GENERAL - well developed and well nourished, in no amount of generalized distress; is an active participant in this examination  HEENT -  PERRLA, Hearing appears normal, conjunctiva and lids are normal, ears and nose appear normal  NECK - no thyromegaly, no JVD, trachea is in the midline  CARDIOVASCULAR - PMI is in the left mid line clavicular position, Normal S1 and S2 with a grade 1/6 systolic murmur. No S3 or S4    PULMONARY - No respiratory distress. scattered wheezes and rales. Breath sounds in both  lung fields are Normal  ABDOMEN  - soft, non tender, no rebound, no hepatomegaly or splenomegaly  MUSCULOSKELETAL  - Prone/Supine, digitals and nails are without clubbing or cyanosis  EXTREMITIES - trace edema  NEUROLOGIC - cranial nerves, II-XII, are normal  SKIN - turgor is normal, no rash  PSYCHIATRIC - was not normal mood and affect, alert and orientated x 3, judgement and insight appear appropriate      LABORATORY EVALUATION & TESTING:    I have personally reviewed and interpreted the results of the following diagnostic testing      EKG and or Telemetry:  which was personally reviewed me:  Sinus rhythm,   Pulse Readings from Last 1 Encounters:   07/09/20 75    bpm,  without Acute changes    Troponin:  Not obtained;  BNP:  Not obtained;  creatinine:  pending    CBC: No results for input(s): WBC, HGB, HCT, MCV, PLT in the last 72 hours. BMP: No results for input(s): NA, K, CL, CO2, PHOS, BUN, CREATININE in the last 72 hours. Invalid input(s): CA  Cardiac Enzymes: No results for input(s): CKTOTAL, CKMB, CKMBINDEX, TROPONINI in the last 72 hours. PT/INR: No results for input(s): PROTIME, INR in the last 72 hours. APTT: No results for input(s): APTT in the last 72 hours.   Liver Profile:  Lab Results   Component Value Date    AST 16 03/21/2020    ALT 21 03/21/2020    BILITOT 0.4 03/21/2020    ALKPHOS 94 03/21/2020     Lab Results   Component Value Date    CHOL 153 03/18/2020    HDL 33 03/18/2020    TRIG 177 03/18/2020     TSH:  No results found for: TSH  UA: No results found for: NITRITE, COLORU, PHUR, LABCAST, WBCUA, RBCUA, MUCUS, TRICHOMONAS, YEAST, BACTERIA, CLARITYU, SPECGRAV, LEUKOCYTESUR, UROBILINOGEN, BILIRUBINUR, BLOODU, GLUCOSEU, AMORPHOUS          ALL THE CARDIOLOGY PROBLEMS ARE LISTED ABOVE; HOWEVER, THE FOLLOWING SPECIFIC CARDIAC PROBLEMS WERE ADDRESSED AND  TREATED DURING [address] MAKING               Cardiac Specific Problem / Diagnosis  Discussion and Data Reviewed Diagnostic or Therapeutic Procedures Ordered Management Options Selected                   1. Presenting problem / symptom    Chest discomfort of ? etiology  are worsening   The chest discomfort is was not exertional and does not appear to represent myocardial ischemia. Nonetheless, He has the following risk factors for the presence of coronary artery disease:    Risk Factor Yes / No / Unknown       Gender Male   Cigarette Use Yes: current every day smoker   Family History of Cardiovascular Disease Yes: heart disease, heart attack   Diabetes Mellitus no   Hypercholesteremia yes   Hypertension yes         He also has the following cardiac history:    Specialty Problems        Cardiology Problems    STEMI (ST elevation myocardial infarction) ([facility] [address]        Coronary artery disease involving native coronary artery of native heart        Essential hypertension        Mixed hyperlipidemia        Chest pain               Yes: cardiac catheterization Continue current medications:    Yes:                 2. CAD Initial presentation during this evaluation   Review and summation of old records:    3/31/2017  SE negative for myocardial ischemia  3/13/20 acute inferior wall STEMI  3/13/2020  myocardial infarction, 100% distal circumflex, 2.25 mm COLEMAN  3/14/2020  Echo  Normal LVFX  7/1/2020  lexiscan Positive for inferior lateral myocardial ischemia, EF 61%, -4% ischemic myocardium on stress, uninterpretable risk findings, AUC indication 21, AUC score 7, (MD Francoise)    Yes: as per the cardiac catheterization Continue current medications:    Yes:            3.  Concern regarding systemic blood pressure Initial presentation during this evaluation No data recorded. No data recorded. No Continue current medications:       yes                   DISCUSSION AND PLAN:      1. I had a detailed discussion with the patient and / or family regarding the historical points, physical examination findings, and any diagnostic results supporting the admission / consultation diagnosis. The patient was educated on care and need for admission. Questions were invited and answered. The patient and / or family shows understanding of admission / consultation information and agrees to follow. 2. Continue present medications except for changes as noted above    3. Continue to monitor rhythm    4. Further orders per clinical course. PRIOR CARDIAC PROBLEM LIST  (IF APPLICABLE):    5/84/4632  SE negative for myocardial ischemia  3/13/20 acute inferior wall STEMI  3/13/2020  myocardial infarction, 100% distal circumflex, 2.25 mm COLEMAN  3/14/2020  Echo  Normal LVFX  7/1/2020  lexiscan Positive for inferior lateral myocardial ischemia, EF 61%, -4% ischemic myocardium on stress, uninterpretable risk findings, AUC indication 21, AUC score 7, Giuliana Beebe MD)         Date of the Proposed Procedure:  07/09/20      Proposed Procedure:  Selective left heart and coronary arteriography with possible percutaneous coronary interventon, (femoral approach), 07/09/20      :  BARBRA Oviedo MD    Indications:  7/1/2020  lexiscan Positive for inferior lateral myocardial ischemia, EF 61%, -4% ischemic myocardium on stress, uninterpretable risk findings, AUC indication 21, AUC score 7, (MD Francoise)     American Society of Anesthesiologists (ASA) Classification:  III    Plan of Sedation:  Moderate Sedation    Mallampati Classification:  II    I have examined this patient on 07/09/20  in [facility] # 10 in the presence of Roscoe Ryan and MONICA FLORSE RN and find no interval changes since the original History and Physical / Consult as noted written by myself on 07/09/20     With the constellation of symptoms and these findings, I recommend cardiac catheterization and possibility of percutaneous coronary intervention. I discussed with him  in detail  the risks, benefits and alternatives to this procedure. The risks mentioned to him include but are not limited to:  vascular complications in ~ 3%, stroke <1%, renal dysfunction <5%, myocardial infarction <1%, coronary dissection <1%, need for emergency open heart surgery <1, and death <1% . He appeared to understand, had no questions, and agreed to proceed with this plan. Additionally, there are risks associated with moderate sedation which includes transient drop in blood pressure and need for assisted ventilation. This procedure is scheduled for 07/09/20 .     Brad Roger MD

## 2020-07-09 NOTE — PROGRESS NOTES
Patient was not evaluated in the clinic, swab was collected due to pre-op testing requirement to screen for COVID-19 using BD Max testing.

## 2020-07-09 NOTE — PROGRESS NOTES
Home medications reviewed with [facility] pharmacy in [address] [place] [address]    Electronically signed by Luis Aleman RN on 7/9/2020 at 5:48 PM

## 2020-07-09 NOTE — PROGRESS NOTES
Spoke with Larisa Ricardo in pharmacy. Patient took 10 mg atorvastatin this am. Advised to wait until tomorrow am to give new dose 80 mg.    Electronically signed by Natalya Nath RN on 7/9/2020 at 6:21 PM

## 2020-07-09 NOTE — PROCEDURES
generated electronically through the [facility]. This report includes additional details regarding this procedure including, but not limited to:     1. Patient Data,   2. Admission,   3. Procedure,   4. Hemodynamics,   5. Vital Signs,   6. Medications, including conscious sedation medications given during the procedure (as noted above),   7. Procedure Log,   8. Device Usage,   9. Signature Audit [place], and,   10. Signatures. It should be noted, that I sign the \"Signatures\" line electronically through the \"HPF Def Portals\" tab on my computer. A time-out was preformed by the nursing staff immediately prior to beginning the procedure and included the following items:      1) Patient Identification: Primo Rahman  2) YOB: 1965  3) Physician:  Cory Petty. Jessica Horne MD  4) Procedure:  Selective left heart and coronary arteriography with possible percutaneous coronary interventon, (femoral approach)  5) Equipment:  Available and ready  6) Site:   Right groin  7) Consents:  Signed and witnessed  8) Allergies:   Hydrocodone    After the time-out was completed, the procedure team, including myself, agreed that this was the correct patient and procedural details. 2% lidocaine was injected above the common femoral artery. Utilizing ultrasound guidance, and the Seldinger technique, the common femoral artery was cannulated and bright red pulsatile blood returned. Using fluoroscopy guidance, the J-tip wire was placed in the common femoral artery. A 6-Fr sheath was inserted. Utilizing 6-Martiniquais Naye catheters, selective coronary arteriography was performed followed by left ventriculography. At this stage utilizing a 6FR FL4 with 2 SH, the mid circumflex was injected. The lesion in the mid circumflex was crossed with a 0.014\" intermediate guide wire. The lesion was then crossed with a 2.0 x 23 mm COLEMAN. A satisfactory angiography result was obtained. At this stage, the equipment was removed.   A right femoral arteriogram was performed in the event that a vascular access closure device was to be deployed. A 6-Persian Mynx vascular access closure device was inserted. Hemostasis was achieved. Sterile dressing was applied. He was taken to his room in stable and satisfactory condition. The results of the procedure were explained to the family in the cardiac catheterization laboratories consult / waiting room      Complications:  none      Results:    Left Heart Pressures:      Site Pressure mmHg        Left ventricular pressure 139/9 mmHg   Left ventricular end-diastolic pressure (LVEDP) 14 mmHg   Aortic pressure 142/72 mmHg   Mean aortic pressure 100 mmHg       Coronary Arteries:    Left Main Coronary Artery:  A large vessel which arises from the left sinus of Valsalva. It divides into the left anterior descending coronary artery and the left circumflex. There is mild diffuse disease throughout the entire length of the vessel. Left Anterior Coronary Artery:  The LAD is a moderate sized vessel with several diagonal branches. There is mild diffuse disease throughout the entire length of the vessel. Left Circumflex Coronary Artery:  The LCx is a moderate sized vessel with several marginal branches. There is a 70% stenosis in the mid portion of this vessel immediately prior to the previously placed stent. There does appear to be an edge dissection of the artery and the stent. Right Coronary Artery:  The RCA is a moderate sized dominant vessel which arises from the right sinus of Valsalva. There is mild diffuse disease throughout the entire length of the vessel. Left Ventriculogram:  The left ventriculogram is obtained in the right oblique projection. There is very mild inferior hypokinesis. All other regional wall segments move appropriately. The estimated visual ejection fraction is 50%.   There is no mitral regurgitation or pull back gradient seen across the aortic valve.      Percutaneous Coronary Intervention:      Lesion Location:   Mid circumflex     Visual % stenosis ROSE flow        Prior to PCI 70 3        Post PCI 0 3         Summary:      1. Successful femoral artery ultrasound  2. Successful femoral artery arterogram  3. Supervision of the administration of moderate conscious sedation  4. Single vessel coronary artery disease involving the circumflex  5. Left ventricular function is impaired in the inferior segment with a visually estimate ejection of 50%. 6.  70% lesion in the mid circumflex  7. Successful percutaneous coronary intervention utilizing a single drug eluding stent to the mid circumflex. 8.  Systemic arterial hypertension (systolic blood pressure > 140 mmHg)      RECOMMENDATIONS:    1. Risk Factor Modification  2. On-going Medical Therapy  3. Dual antiplatelet therapy for 12 months.       Electronically signed by Babak Grayson MD on 7/9/20 at 4:44 PM CDT

## 2020-07-10 VITALS
TEMPERATURE: 97.4 F | OXYGEN SATURATION: 93 % | SYSTOLIC BLOOD PRESSURE: 116 MMHG | HEART RATE: 58 BPM | HEIGHT: 70 IN | RESPIRATION RATE: 18 BRPM | DIASTOLIC BLOOD PRESSURE: 69 MMHG | BODY MASS INDEX: 25.77 KG/M2 | WEIGHT: 180 LBS

## 2020-07-10 LAB
EKG P AXIS: 58 DEGREES
EKG P-R INTERVAL: 196 MS
EKG Q-T INTERVAL: 388 MS
EKG QRS DURATION: 94 MS
EKG QTC CALCULATION (BAZETT): 387 MS
EKG T AXIS: -7 DEGREES
HCT VFR BLD CALC: 44.3 % (ref 42–52)
HEMOGLOBIN: 15 G/DL (ref 14–18)
LV EF: 50 %
LVEF MODALITY: NORMAL
MCH RBC QN AUTO: 30.1 PG (ref 27–31)
MCHC RBC AUTO-ENTMCNC: 33.9 G/DL (ref 33–37)
MCV RBC AUTO: 88.8 FL (ref 80–94)
PDW BLD-RTO: 13.2 % (ref 11.5–14.5)
PLATELET # BLD: 224 K/UL (ref 130–400)
PMV BLD AUTO: 9.7 FL (ref 9.4–12.4)
RBC # BLD: 4.99 M/UL (ref 4.7–6.1)
TROPONIN: 0.01 NG/ML (ref 0–0.03)
TROPONIN: 0.02 NG/ML (ref 0–0.03)
WBC # BLD: 11 K/UL (ref 4.8–10.8)

## 2020-07-10 PROCEDURE — G0378 HOSPITAL OBSERVATION PER HR: HCPCS

## 2020-07-10 PROCEDURE — 84484 ASSAY OF TROPONIN QUANT: CPT

## 2020-07-10 PROCEDURE — 93010 ELECTROCARDIOGRAM REPORT: CPT | Performed by: INTERNAL MEDICINE

## 2020-07-10 PROCEDURE — 2580000003 HC RX 258: Performed by: INTERNAL MEDICINE

## 2020-07-10 PROCEDURE — 93005 ELECTROCARDIOGRAM TRACING: CPT | Performed by: INTERNAL MEDICINE

## 2020-07-10 PROCEDURE — 85027 COMPLETE CBC AUTOMATED: CPT

## 2020-07-10 PROCEDURE — 6370000000 HC RX 637 (ALT 250 FOR IP): Performed by: INTERNAL MEDICINE

## 2020-07-10 PROCEDURE — 99217 PR OBSERVATION CARE DISCHARGE MANAGEMENT: CPT | Performed by: INTERNAL MEDICINE

## 2020-07-10 PROCEDURE — 36415 COLL VENOUS BLD VENIPUNCTURE: CPT

## 2020-07-10 RX ORDER — PRASUGREL 10 MG/1
10 TABLET, FILM COATED ORAL DAILY
Qty: 30 TABLET | Refills: 12 | Status: SHIPPED | OUTPATIENT
Start: 2020-07-11

## 2020-07-10 RX ORDER — ATORVASTATIN CALCIUM 80 MG/1
80 TABLET, FILM COATED ORAL DAILY
Qty: 30 TABLET | Refills: 3 | Status: SHIPPED | OUTPATIENT
Start: 2020-07-11

## 2020-07-10 RX ADMIN — ATORVASTATIN CALCIUM 80 MG: 80 TABLET, FILM COATED ORAL at 08:52

## 2020-07-10 RX ADMIN — METOPROLOL SUCCINATE 25 MG: 25 TABLET, EXTENDED RELEASE ORAL at 08:53

## 2020-07-10 RX ADMIN — PRASUGREL 10 MG: 10 TABLET, FILM COATED ORAL at 08:53

## 2020-07-10 RX ADMIN — ASPIRIN 81 MG: 81 TABLET, CHEWABLE ORAL at 08:52

## 2020-07-10 RX ADMIN — SODIUM CHLORIDE, PRESERVATIVE FREE 10 ML: 5 INJECTION INTRAVENOUS at 08:53

## 2020-07-10 RX ADMIN — LOSARTAN POTASSIUM 25 MG: 25 TABLET ORAL at 08:52

## 2020-07-10 ASSESSMENT — PAIN SCALES - WONG BAKER
WONGBAKER_NUMERICALRESPONSE: 0

## 2020-07-10 ASSESSMENT — PAIN SCALES - GENERAL: PAINLEVEL_OUTOF10: 0

## 2020-07-10 NOTE — DISCHARGE SUMMARY
[facility]    Discharge Summary          I personally saw the patient and rounded with:  Greg Anders RN, on  7/10/20      The observations documented in this note, including the assessment and plan are mine              Patient ID: Ignacia Almaraz      Patient's PCP: MICHELLE Tamayo    Admit Date: 7/9/2020     Discharge Date:  07/10/20     Admitting Physician:  Travis Roman MD       Discharge Physician: Travis Roman MD     Discharge Diagnoses:    1. Coronary artery disease    3/31/2017  SE negative for myocardial ischemia  3/13/20 acute inferior wall STEMI  3/13/2020  myocardial infarction, 100% distal circumflex, 2.25 mm COLEMAN  3/14/2020  Echo  Normal LVFX  7/1/2020  lexiscan Positive for inferior lateral myocardial ischemia, EF 61%, -4% ischemic myocardium on stress, uninterpretable risk findings, AUC indication 21, AUC score 7, (MD Francoise)   7/9/20  Cath  70% mid circ, 2.0 x 23 COLEMAN, o/w luminals, very mild inferior hypo, EF 50%    2. Systemic arterial hypertension  3. Hypercholesteremia  4. Ongoing cigarette use  5. Family history of cardiovascular disease      Cardiac Specific Diagnoses:    Specialty Problems        Cardiology Problems    STEMI (ST elevation myocardial infarction) ([address]        Coronary artery disease involving native coronary artery of native heart        Essential hypertension        Mixed hyperlipidemia        Chest pain                The patient was seen and examined on day of discharge and this discharge summary is in conjunction with any daily progress note from day of discharge.       History of Present Illness:     Ignacia Almaraz is a 47 y.o. male who presents to [address] [place] [address] (admitted on 7/9/2020  1:23 PM) with symptoms / signs / problem or diagnosis of need for cardiac catheterization.      He is normal mood and affect, alert and orientated x 3, judgement and insight appear appropriate.       Family present:  No     At the beginning of the interview he was lying on a stretcher and getting ready for cath.        CONTEXT OF THIS HISTORY OF PRESENT ILLNESS INCLUDE: (IF APPLICABLE):     Presentation:  He  presented with need for cardiac catheterization.     The patient was seen in the office on 6/1/2020 by Roselia KERNS and the following was noted:     \"The patient presents today for cardiology follow up.  The patient is s/p  inferior wall MI on 3/12/20 with subsequent COLEMAN to distal circumflex.  2D echo showed normal EF.  Patient reports some pain in right axilla and chest recently stating \"feels like a swelling and soreness when I sit down at night\".  Duration - at least one hour.  No associated symptoms.  Seems to be related to anxiety.  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 [facility] not notice discomfort with activity.  The discomfort is not similar to what he experienced with MI.  The patient was unable to do cardiac rehab due to coronavirus pandemic. [facility] does not want to at this point as he is working and fairly active.  The patient also having parietal headaches at times.  The patient denies symptoms to suggest myocardial ischemia, heart failure or arrhythmia.  BP is well controlled on current regimen.  The patient's PCP monitors cholesterol.       Subjective  Desmond denies shortness of breath, orthopnea, paroxysmal nocturnal dyspnea, syncope, presyncope, sensed arrhythmia, edema and fatigue.  The patient denies numbness or weakness to suggest cerebrovascular accident or transient ischemic attack.  + Patient reports some pain in right axilla and chest recently stating \"feels like a swelling and soreness when I sit down at night\".  Duration - at least one hour.  No associated symptoms.  Seems to be related to anxiety.  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 [facility] not notice discomfort with activity.  The discomfort is not similar to what he experienced with MI.\"      Additionally,  It was noted by symptoms of exertional chest discomfort, unusual or change in shortness of air, presyncope or syncope. Hospital Course:     After admission, the patient underwent cardiac catheterization according to the following criteria:    7/1/2020  lexiscan Positive for inferior lateral myocardial ischemia, EF 61%, -4% ischemic myocardium on stress, uninterpretable risk findings, AUC indication 21, AUC score 7, John Guevara MD) , Diagnostic Catheterization Appropriate Use Criteria Description, [facility] 2012;59:[id number] . Selective left heart and coronary arteriography was preformed, which revealed:    1. Successful femoral artery ultrasound  2. Successful femoral artery arterogram  3. Supervision of the administration of moderate conscious sedation  4. Single vessel coronary artery disease involving the circumflex  5. Left ventricular function is impaired in the inferior segment with a visually estimate ejection of 50%. 6.  70% lesion in the mid circumflex  7. Successful percutaneous coronary intervention utilizing a single drug eluding stent to the mid circumflex. 8.  Systemic arterial hypertension (systolic blood pressure > 140 mmHg). There were no apparent complications. The rest of the patient's hospitalization was uneventful. He was discharged home on medical therapy with outpatient follow up. Consults:     IP CONSULT TO CARDIAC REHAB      Significant Diagnostic Studies:    1. Selective left heart and coronary arteriography (femoral approach), 7/9/2020       Significant Therapeutic Endeavors:      1. Percutaneous coronary intervention to the mid circumflex, 7/9/2020      Activity: activity as directed per discharge instructions. Diet:  Cardiac diet    Labs:  For convenience and continuity at follow-up the following most recent labs are provided:    CBC:    Lab Results   Component Value Date    WBC 11.0 07/10/2020    HGB 15.0 07/10/2020    HCT 44.3 07/10/2020     07/10/2020 Renal:    Lab Results   Component Value Date     07/09/2020    K 4.4 07/09/2020    K 4.9 03/18/2020     07/09/2020    CO2 22 07/09/2020    BUN 12 07/09/2020    CREATININE 0.8 07/09/2020    CALCIUM 9.2 07/09/2020         Discharge Medications:     Current Discharge Medication List           Details   prasugrel (EFFIENT) 10 MG TABS Take 1 tablet by mouth daily  Qty: 30 tablet, Refills: 12              Details   atorvastatin (LIPITOR) 80 MG tablet Take 1 tablet by mouth daily  Qty: 30 tablet, Refills: 3              Details   aspirin 81 MG chewable tablet Take 1 tablet by mouth daily  Qty: 30 tablet, Refills: 3      losartan (COZAAR) 25 MG tablet Take 1 tablet by mouth daily  Qty: 30 tablet, Refills: 5      metoprolol succinate (TOPROL XL) 25 MG extended release tablet Take 1 tablet by mouth daily  Qty: 30 tablet, Refills: 3      nitroGLYCERIN (NITROSTAT) 0.4 MG SL tablet up to max of 3 total doses. If no relief after 1 dose, call 911. Qty: 25 tablet, Refills: 0               Condition At Discharge:  Improved    Disposition:        1. Home  2. Follow up with cardiology as arranged  3. Follow up with primary care provider as arranged  4. For patients who underwent percutaneous coronary intervention during this hospitalization, they will be sent home on:     A. Aspirin, yes, ASA 81 mg orally once a day    B. Antiplatelet, yes, Effient 10 mg orally once a day    C. Beta - blocker, yes, Toprol 25 mg orally once a day    D. ACE-inhibitor or ARB, yes, Cozaar 25 mg orally once a day    E. Statin, yes, Lipitor 80 mg orally once a day      Ok with me to discharge after the bedrest is complete, hemostatis is achieved, the patient is hemodynamically stable and comfortable. Please remind the patient that driving is absolutely prohibited for the next day (24 hours) after this procedure. Additionally, caution should be exercised to avoid heights, swimming, tub baths, open flames, or heavy machinery. Electronically signed by Charmaine Polk MD on 7/10/20

## 2020-07-10 NOTE — PLAN OF CARE
Problem: Falls - Risk of:  Goal: Will remain free from falls  Outcome: Ongoing  Goal: Absence of physical injury  Outcome: Ongoing     Problem: Cardiac:  Goal: Ability to maintain an adequate cardiac output will improve  Outcome: Ongoing  Goal: Complications related to the disease process, condition or treatment will be avoided or minimized  Outcome: Ongoing  Goal: Hemodynamic stability will improve  Outcome: Ongoing  Goal: Risk factors for ineffective tissue perfusion will decrease  Outcome: Ongoing     Problem: Fluid Volume:  Goal: Will show no signs or symptoms of fluid imbalance  Outcome: Ongoing     Problem: Bleeding:  Goal: Will show no signs and symptoms of excessive bleeding  Outcome: Ongoing

## 2020-07-17 ENCOUNTER — HOSPITAL ENCOUNTER (EMERGENCY)
Age: 55
Discharge: HOME | End: 2020-07-17
Payer: MEDICAID

## 2020-07-17 VITALS
WEIGHT: 190 LBS | SYSTOLIC BLOOD PRESSURE: 142 MMHG | BODY MASS INDEX: 27.26 KG/M2 | RESPIRATION RATE: 20 BRPM | HEART RATE: 75 BPM | OXYGEN SATURATION: 98 % | TEMPERATURE: 97.7 F | DIASTOLIC BLOOD PRESSURE: 76 MMHG

## 2020-07-17 PROCEDURE — 99283 EMERGENCY DEPT VISIT LOW MDM: CPT

## 2020-07-17 PROCEDURE — 93922 UPR/L XTREMITY ART 2 LEVELS: CPT

## 2020-07-17 PROCEDURE — 93926 LOWER EXTREMITY STUDY: CPT

## 2020-07-17 NOTE — PROGRESS NOTES
Vascular Preliminary Report  Duplex scan of right groin shows no evidence for pseudoaneurysm, hematoma, fistula, or DVT noted at this time. B/L LE WANG studies performed. Right PTA:  1.11  Left PTA:  0.61           DPA:  1.10        DPA:  0.57           TOE:  0.63        TOE:  0.47    Final report pending.

## 2020-07-17 NOTE — ED PROVIDER NOTES
[facility]  eMERGENCY dEPARTMENT eNCOUnter      Pt Name: Tana Dominguez  MRN: [id number]  [place] 1965  Date of evaluation: 7/17/2020  Provider: LUIS F Rain    CHIEF COMPLAINT       Chief Complaint   Patient presents with    Other     pt had heart cath last thursday through R groin and now co his leg 'feeling weird'. he reports 'it feels like something is running down his leg.'  pt denies pain or bleeding or unusual bruising         HISTORY OF PRESENT ILLNESS   (Location/Symptom, Timing/Onset,Context/Setting, Quality, Duration, Modifying Factors, Severity)  Note limiting factors. Tana Dominguez is a 47 y.o. male who presents to the emergency department with a feeling like something running down his leg after a heart cath 8 days ago. This occurs intermittently and briefly. No pain. Sometimes feels cold. Has been having some problems with anxiety since his heart problems    The history is provided by the patient. Other   This is a new problem. The current episode started more than 1 week ago. The problem occurs daily. The problem has not changed since onset. NursingNotes were reviewed. REVIEW OF SYSTEMS    (2-9 systems for level 4, 10 or more for level 5)     Review of Systems   Constitutional: Negative for fever. Musculoskeletal: Positive for arthralgias. Neurological: Negative for numbness. Psychiatric/Behavioral: The patient is nervous/anxious. Except as noted above the remainder of the review of systems was reviewed and negative.        PAST MEDICAL HISTORY     Past Medical History:   Diagnosis Date    Arthritis     CAD (coronary artery disease)     High blood cholesterol     Hypertension          SURGICALHISTORY       Past Surgical History:   Procedure Laterality Date    CARPAL TUNNEL RELEASE Right     COLONOSCOPY      CORONARY ANGIOPLASTY WITH STENT PLACEMENT      EYE SURGERY Left     cataract          CURRENT MEDICATIONS       Discharge Medication List as of 7/17/2020  6:36 PM      CONTINUE these medications which have NOT CHANGED    Details   atorvastatin (LIPITOR) 80 MG tablet Take 1 tablet by mouth daily, Disp-30 tablet, R-3Normal      prasugrel (EFFIENT) 10 MG TABS Take 1 tablet by mouth daily, Disp-30 tablet, R-12Normal      aspirin 81 MG chewable tablet Take 1 tablet by mouth daily, Disp-30 tablet, R-3Normal      losartan (COZAAR) 25 MG tablet Take 1 tablet by mouth daily, Disp-30 tablet, R-5Normal      metoprolol succinate (TOPROL XL) 25 MG extended release tablet Take 1 tablet by mouth daily, Disp-30 tablet, R-3Normal      nitroGLYCERIN (NITROSTAT) 0.4 MG SL tablet up to max of 3 total doses.  If no relief after 1 dose, call 911., Disp-25 tablet, R-0Normal             ALLERGIES     Hydrocodone    FAMILY HISTORY       Family History   Problem Relation Age of Onset    Cancer Mother     Heart Disease Father     Heart Attack Sister     Heart Attack Brother           SOCIAL HISTORY       Social History     Socioeconomic History    Marital status:      Spouse name: Not on file    Number of children: Not on file    Years of education: Not on file    Highest education level: Not on file   Occupational History    Not on file   Social Needs    Financial resource strain: Not on file    Food insecurity     Worry: Not on file     Inability: Not on file    Transportation needs     Medical: Not on file     Non-medical: Not on file   Tobacco Use    Smoking status: Current Every Day Smoker     Packs/day: 2.00     Years: 30.00     Pack years: 60.00     Types: Cigarettes    Smokeless tobacco: Never Used   Substance and Sexual Activity    Alcohol use: Never     Frequency: Never    Drug use: Never    Sexual activity: Not on file   Lifestyle    Physical activity     Days per week: Not on file     Minutes per session: Not on file    Stress: Not on file   Relationships    Social connections     Talks on phone: Not on file     Gets together: Not on file LEVELS   Final Result      neg      ED BEDSIDEULTRASOUND:   Performed by ED Physician -none    LABS:  Labs Reviewed - No data to display    All other labs were within normal range or not returned as of this dictation. EMERGENCY DEPARTMENT COURSE and DIFFERENTIALDIAGNOSIS/MDM:   Vitals:    Vitals:    07/17/20 1537   BP: (!) 142/76   Pulse: 75   Resp: 20   Temp: 97.7 °F (36.5 °C)   TempSrc: Temporal   SpO2: 98%   Weight: 190 lb (86.2 kg)           MDM      CONSULTS:  None    PROCEDURES:  Unless otherwise noted below, none     Procedures    FINAL IMPRESSION      1. Right groin pain    2. Disturbance of skin sensation    3.  S/P cardiac catheterization        DISPOSITION/PLAN   DISPOSITION        PATIENT REFERRED TO:  [address] [place] [address]  [phone]            DISCHARGE MEDICATIONS:  Discharge Medication List as of 7/17/2020  6:36 PM             (Please note that portions of this note were completed with a voice recognitionprogram.  Efforts were made to edit the dictations but occasionally words are mis-transcribed.)    LUIS F Chiang (electronically signed)          LUIS F Chiang  07/19/20 1107

## 2020-07-20 ENCOUNTER — TELEPHONE (OUTPATIENT)
Dept: CARDIOLOGY | Age: 55
End: 2020-07-20

## 2020-08-17 ENCOUNTER — OFFICE VISIT (OUTPATIENT)
Age: 55
End: 2020-08-17

## 2020-08-17 VITALS — HEART RATE: 82 BPM | OXYGEN SATURATION: 95 % | TEMPERATURE: 97 F

## 2020-08-20 LAB — SARS-COV-2, NAA: NOT DETECTED

## 2023-05-17 NOTE — PROGRESS NOTES
Patient here today for nuclear treadmill stress test. Patient was unable to complete walking on the treadmill due to injured right hip. Order changed to Abdulkadir Loredo.   Test completed
no

## 2024-02-19 ENCOUNTER — TELEPHONE (OUTPATIENT)
Dept: GASTROENTEROLOGY | Age: 59
End: 2024-02-19

## 2024-02-19 NOTE — TELEPHONE ENCOUNTER
Called patient to remind them of their procedure with Dr. Darian Emanuel  at [facility]  on 2/21/24 to arrive at 7:00AM     NO ANSWER - NO WAY TO LM

## 2024-02-20 ENCOUNTER — ANESTHESIA EVENT (OUTPATIENT)
Dept: ENDOSCOPY | Age: 59
End: 2024-02-20
Payer: MEDICAID

## 2024-02-20 ASSESSMENT — LIFESTYLE VARIABLES: SMOKING_STATUS: 1

## 2024-02-20 NOTE — ANESTHESIA PRE PROCEDURE
Diagnosis Code   • STEMI (ST elevation myocardial infarction) ([facility]) I21.3   • Coronary artery disease involving native coronary artery of native heart I25.10   • Smoker F17.200   • History of myocardial infarction I25.2   • History of coronary artery stent placement Z95.5   • Mixed hyperlipidemia E78.2   • Essential hypertension I10   • Chest pain R07.9   • Abnormal nuclear cardiac imaging test R93.1       Past Medical History:        Diagnosis Date   • Arthritis    • CAD (coronary artery disease)    • High blood cholesterol    • Hypertension        Past Surgical History:        Procedure Laterality Date   • CARPAL TUNNEL RELEASE Right    • COLONOSCOPY     • CORONARY ANGIOPLASTY WITH STENT PLACEMENT     • EYE SURGERY Left     cataract        Social History:    Social History     Tobacco Use   • Smoking status: Every Day     Current packs/day: 2.00     Average packs/day: 2.0 packs/day for 30.0 years (60.0 ttl pk-yrs)     Types: Cigarettes   • Smokeless tobacco: Never   Substance Use Topics   • Alcohol use: Never                                Ready to quit: Not Answered  Counseling given: Not Answered      Vital Signs (Current): There were no vitals filed for this visit.                                           BP Readings from Last 3 Encounters:   07/17/20 (!) 142/76   07/10/20 116/69   06/01/20 132/80       NPO Status:                                                                                 BMI:   Wt Readings from Last 3 Encounters:   07/17/20 86.2 kg (190 lb)   07/09/20 81.6 kg (180 lb)   06/01/20 88.9 kg (196 lb)     There is no height or weight on file to calculate BMI.    CBC:   Lab Results   Component Value Date/Time    WBC 11.0 07/10/2020 05:39 AM    RBC 4.99 07/10/2020 05:39 AM    HGB 15.0 07/10/2020 05:39 AM    HCT 44.3 07/10/2020 05:39 AM    MCV 88.8 07/10/2020 05:39 AM    RDW 13.2 07/10/2020 05:39 AM     07/10/2020 05:39 AM       CMP:   Lab Results   Component Value Date/Time

## 2024-02-21 ENCOUNTER — HOSPITAL ENCOUNTER (OUTPATIENT)
Age: 59
Setting detail: OUTPATIENT SURGERY
Discharge: HOME | End: 2024-02-21
Attending: INTERNAL MEDICINE | Admitting: INTERNAL MEDICINE
Payer: MEDICAID

## 2024-02-21 ENCOUNTER — ANESTHESIA (OUTPATIENT)
Dept: ENDOSCOPY | Age: 59
End: 2024-02-21
Payer: MEDICAID

## 2024-02-21 VITALS
SYSTOLIC BLOOD PRESSURE: 111 MMHG | HEIGHT: 70 IN | DIASTOLIC BLOOD PRESSURE: 72 MMHG | OXYGEN SATURATION: 97 % | WEIGHT: 205 LBS | BODY MASS INDEX: 29.35 KG/M2 | RESPIRATION RATE: 16 BRPM | TEMPERATURE: 98.3 F | HEART RATE: 76 BPM

## 2024-02-21 PROCEDURE — 7100000011 HC PHASE II RECOVERY - ADDTL 15 MIN: Performed by: INTERNAL MEDICINE

## 2024-02-21 PROCEDURE — 3700000001 HC ADD 15 MINUTES (ANESTHESIA): Performed by: INTERNAL MEDICINE

## 2024-02-21 PROCEDURE — 6360000002 HC RX W HCPCS: Performed by: NURSE ANESTHETIST, CERTIFIED REGISTERED

## 2024-02-21 PROCEDURE — 3700000000 HC ANESTHESIA ATTENDED CARE: Performed by: INTERNAL MEDICINE

## 2024-02-21 PROCEDURE — 3609027000 HC COLONOSCOPY: Performed by: INTERNAL MEDICINE

## 2024-02-21 PROCEDURE — 45378 DIAGNOSTIC COLONOSCOPY: CPT | Performed by: INTERNAL MEDICINE

## 2024-02-21 PROCEDURE — 2500000003 HC RX 250 WO HCPCS: Performed by: NURSE ANESTHETIST, CERTIFIED REGISTERED

## 2024-02-21 PROCEDURE — 2709999900 HC NON-CHARGEABLE SUPPLY: Performed by: INTERNAL MEDICINE

## 2024-02-21 PROCEDURE — 2580000003 HC RX 258: Performed by: INTERNAL MEDICINE

## 2024-02-21 PROCEDURE — 7100000010 HC PHASE II RECOVERY - FIRST 15 MIN: Performed by: INTERNAL MEDICINE

## 2024-02-21 RX ORDER — LIDOCAINE HYDROCHLORIDE 10 MG/ML
INJECTION, SOLUTION INFILTRATION; PERINEURAL PRN
Status: DISCONTINUED | OUTPATIENT
Start: 2024-02-21 | End: 2024-02-21 | Stop reason: SDUPTHER

## 2024-02-21 RX ORDER — MIDAZOLAM HYDROCHLORIDE 1 MG/ML
INJECTION INTRAMUSCULAR; INTRAVENOUS PRN
Status: DISCONTINUED | OUTPATIENT
Start: 2024-02-21 | End: 2024-02-21 | Stop reason: SDUPTHER

## 2024-02-21 RX ORDER — SODIUM CHLORIDE, SODIUM LACTATE, POTASSIUM CHLORIDE, CALCIUM CHLORIDE 600; 310; 30; 20 MG/100ML; MG/100ML; MG/100ML; MG/100ML
INJECTION, SOLUTION INTRAVENOUS CONTINUOUS
Status: DISCONTINUED | OUTPATIENT
Start: 2024-02-21 | End: 2024-02-21 | Stop reason: HOSPADM

## 2024-02-21 RX ORDER — PROPOFOL 10 MG/ML
INJECTION, EMULSION INTRAVENOUS PRN
Status: DISCONTINUED | OUTPATIENT
Start: 2024-02-21 | End: 2024-02-21 | Stop reason: SDUPTHER

## 2024-02-21 RX ADMIN — PROPOFOL 200 MG: 10 INJECTION, EMULSION INTRAVENOUS at 08:02

## 2024-02-21 RX ADMIN — LIDOCAINE HYDROCHLORIDE 40 MG: 10 INJECTION, SOLUTION INFILTRATION; PERINEURAL at 08:02

## 2024-02-21 RX ADMIN — MIDAZOLAM 2 MG: 1 INJECTION INTRAMUSCULAR; INTRAVENOUS at 08:05

## 2024-02-21 RX ADMIN — SODIUM CHLORIDE, POTASSIUM CHLORIDE, SODIUM LACTATE AND CALCIUM CHLORIDE: 600; 310; 30; 20 INJECTION, SOLUTION INTRAVENOUS at 07:22

## 2024-02-21 ASSESSMENT — PAIN - FUNCTIONAL ASSESSMENT
PAIN_FUNCTIONAL_ASSESSMENT: NONE - DENIES PAIN
PAIN_FUNCTIONAL_ASSESSMENT: 0-10

## 2024-02-21 NOTE — ANESTHESIA POSTPROCEDURE EVALUATION
Department of Anesthesiology  Postprocedure Note    Patient: Desmond Shearer  MRN: [id number]  YOB: 1965  Date of evaluation: 2/21/2024    Procedure Summary     Date: 02/21/24 Room / Location: [place] [address] / [facility]    Anesthesia Start: 0759 Anesthesia Stop:     Procedure: COLORECTAL CANCER SCREENING, NOT HIGH RISK Diagnosis:       Positive colorectal cancer screening using Cologuard test      (Positive colorectal cancer screening using Cologuard test [R19.5])    Surgeons: Darian Emanuel MD Responsible Provider: Dianelys Negron APRN - CRNA    Anesthesia Type: general, TIVA ASA Status: 3          Anesthesia Type: No value filed.    Sri Phase I: Sri Score: 10    Sri Phase II:      Anesthesia Post Evaluation    Patient location during evaluation: bedside  Patient participation: complete - patient participated  Level of consciousness: sleepy but conscious  Pain score: 0  Airway patency: patent  Nausea & Vomiting: no nausea and no vomiting  Cardiovascular status: hemodynamically stable and blood pressure returned to baseline  Respiratory status: acceptable and nasal cannula  Hydration status: stable  Pain management: adequate        No notable events documented.

## 2024-02-21 NOTE — OP NOTE
Patient: Desmond Shearer : 1965  [facility] Rec#: [id number] Acc#: [id number]   Primary Care Provider Fransisco Wilson PA    Date of Procedure:  2024    Endoscopist: Darian Emanuel MD    Referring Provider: Fransisco Wilson PA,     Operation Performed: Colonoscopy     Indications: screening    Anesthesia:  Sedation was administered by anesthesia who monitored the patient during the procedure.    I met with Desmond Shearer prior to procedure. We discussed the procedure itself, and I have discussed the risks of endoscopy (including-- but not limited to-- pain, discomfort, bleeding potentially requiring second endoscopic procedure and/or blood transfusion, organ perforation requiring operative repair, damage to organs near the colon, infection, aspiration, cardiopulmonary/allergic reaction), benefits, indications to endoscopy. Additionally, we discussed options other than colonoscopy. The patient expressed understanding. All questions answered. The patient decided to proceed with the procedure.  Signed informed consent was placed on the chart.    Blood Loss: minimal    Withdrawal time: > 6 minutes  Bowel Prep: adequate     Complications: no immediate complications    DESCRIPTION OF PROCEDURE:     A time out was performed. After written informed consent was obtained, the patient was placed in the left lateral position.     The perianal area was inspected, and a digital rectal exam was performed. A rectal exam was performed: normal tone, no palpable lesions. At this point, a forward viewing Olympus colonoscope was inserted into the anus and carefully advanced to the cecum.  The cecum was identified by the ileocecal valve and the appendiceal orifice. The colonoscope was then slowly withdrawn with careful inspection of the mucosa in a linear and circumferential fashion. The scope was retroflexed in the rectum. Suction was utilized during the procedure to remove as much air as possible from the bowel. The colonoscope was

## 2024-02-21 NOTE — DISCHARGE INSTRUCTIONS
Recommendations:  1. Repeat colonoscopy - 10 yrs for screening    Colonoscopy: What to Expect at Home  Your Recovery    After the test, you may be bloated or have gas pains. You may need to pass gas. If a biopsy was done or a polyp was removed, you may have streaks of blood in your stool (feces) for a few days. Problems such as heavy rectal bleeding may not occur until several weeks after the test. This isn't common. But it can happen after polyps are removed.  This care sheet gives you a general idea about how long it will take for you to recover. But each person recovers at a different pace. Follow the steps below to get better as quickly as possible.    How can you care for yourself at home?  Activity    Rest when you feel tired.     You can do your normal activities when it feels okay to do so.   Diet    You may resume your regular diet.     Drink plenty of fluids. This helps to replace the fluids that were lost during the colon prep.     Do not drink alcohol.   Medicines    Your doctor will tell you if and when you can restart your medicines. You will also be given instructions about taking any new medicines.     If you stopped taking aspirin or some other blood thinner, your doctor will tell you when to start taking it again.     If polyps were removed or a biopsy was done during the test, your doctor may tell you not to take aspirin or other anti-inflammatory medicines for a few days. These include ibuprofen (Advil, Motrin) and naproxen (Aleve).   Other instructions    For your safety, do not drive or operate machinery for 24 hours.     Do not sign legal documents or make major decisions until the medicine wears off and you can think clearly. The anesthesia can make it hard for you to fully understand what you are agreeing to, and cause impaired judgement.     When should you call for help?   Call 911 anytime you think you may need emergency care. For example, call if:    You passed out (lost consciousness).

## 2024-02-21 NOTE — H&P
Patient Name: Desmond Shearer  : 1965  MRN: [id number]  DATE: 24    Allergies:   Allergies   Allergen Reactions    Hydrocodone         ENDOSCOPY  History and Physical    Procedure:    [] Diagnostic Colonoscopy       [x] Screening Colonoscopy  [] EGD      [] ERCP      [] EUS       [] Other    [x] Previous office notes/History and Physical reviewed from the patients chart. Please see EMR for further details of HPI. I have examined the patient's status immediately prior to the procedure and:      Indications/HPI:    []Abdominal Pain   []Barretts  [x]Screening/Surveillance   []History of Polyps  []Dysphagia            [] +Cologard/DNA testing  []Abnormal Imaging              []EOE Hx              [] Family Hx of CRC/Polyps  []Anemia                            []Food Impaction       []Recent Poor Prep  []GI Bleed             []Lymphadenopathy  []History of Polyps  []Change in bowel habits []Heartburn/Reflux  []Cancer- GI/Lung  []Chest Pain - Non Cardiac []Heme (+) Stool []Ulcers  []Constipation  []Hemoptysis  []Incontinence    []Diarrhea  []Hypoxemia  []Rectal Bleed (BRBPR)  []Nausea/Vomiting   [] Varices  []Crohns/Colitis  []Pancreatic Cyst   [] Cirrhosis   []Pancreatitis    []Abnormal MRCP  []Elevated LFT [] Stent Removal, Previous ERCP  []Other:     Anesthesia:   [x] MAC [] Moderate Sedation   [] General   [] None     ROS: 12 pt Review of Symptoms was negative unless mentioned above    Medications:   Prior to Admission medications    Medication Sig Start Date End Date Taking? Authorizing Provider   atorvastatin (LIPITOR) 80 MG tablet Take 1 tablet by mouth daily 20   Zachery Owusu MD   nitroGLYCERIN (NITROSTAT) 0.4 MG SL tablet up to max of 3 total doses. If no relief after 1 dose, call 911. 20   Lyudmila Broderick APRN - NP   aspirin 81 MG chewable tablet Take 1 tablet by mouth daily 3/16/20   Anisha Roberts MD   losartan (COZAAR) 25 MG tablet Take 1 tablet by mouth daily 3/15/20

## 2025-05-20 ENCOUNTER — TRANSCRIBE ORDERS (OUTPATIENT)
Dept: ADMINISTRATIVE | Age: 60
End: 2025-05-20

## 2025-05-20 DIAGNOSIS — I25.119 ATHEROSCLEROSIS OF NATIVE CORONARY ARTERY WITH ANGINA PECTORIS, UNSPECIFIED WHETHER NATIVE OR TRANSPLANTED HEART: Primary | ICD-10-CM

## 2025-05-21 ENCOUNTER — TRANSCRIBE ORDERS (OUTPATIENT)
Dept: ADMINISTRATIVE | Age: 60
End: 2025-05-21

## 2025-05-21 DIAGNOSIS — I25.119 ATHEROSCLEROSIS OF NATIVE CORONARY ARTERY OF NATIVE HEART WITH ANGINA PECTORIS: Primary | ICD-10-CM

## 2025-06-26 ENCOUNTER — TELEPHONE (OUTPATIENT)
Dept: CARDIOLOGY CLINIC | Age: 60
End: 2025-06-26

## 2025-06-26 NOTE — TELEPHONE ENCOUNTER
Patient came in to be seen by Dr. Castillo. Patient was scheduled from a referral by the pre-service department. Patient came in for his appt and Dr. Castillo was running late from having to be over at the hospital so they had to wait. The patient and his wife both were under the impresson that he was having his ECHO and Stress test today. Those tests were ordered by Dr. Dieter Wilson's office but were never scheduled. They still are active and need to be scheduled. I offered but they wanted to cancel their appt for today. They were very confused on what had happened. I am not sure why the testing was never scheduled. 6/26/25 AH

## 2025-06-26 NOTE — TELEPHONE ENCOUNTER
Call patient and left message requesting a call back. Patient arrived to office and was informed that he would need to reschedule, patient was rescheduled for 1015 on 06/26/2025. Patient presented to the  and informed he did not want to be seen. Patient then called to follow up about this, left message requesting a call back.

## (undated) DEVICE — ENDO KIT,LOURDES HOSPITAL: Brand: MEDLINE INDUSTRIES, INC.